# Patient Record
Sex: MALE | Race: WHITE | NOT HISPANIC OR LATINO | Employment: OTHER | URBAN - METROPOLITAN AREA
[De-identification: names, ages, dates, MRNs, and addresses within clinical notes are randomized per-mention and may not be internally consistent; named-entity substitution may affect disease eponyms.]

---

## 2019-09-06 ENCOUNTER — TRANSCRIBE ORDERS (OUTPATIENT)
Dept: ADMINISTRATIVE | Facility: HOSPITAL | Age: 69
End: 2019-09-06

## 2019-09-06 DIAGNOSIS — I10 ESSENTIAL HYPERTENSION, MALIGNANT: ICD-10-CM

## 2019-09-06 DIAGNOSIS — I48.0 PAROXYSMAL ATRIAL FIBRILLATION (HCC): Primary | ICD-10-CM

## 2019-09-17 ENCOUNTER — HOSPITAL ENCOUNTER (OUTPATIENT)
Dept: NUCLEAR MEDICINE | Facility: HOSPITAL | Age: 69
Discharge: HOME | End: 2019-09-17

## 2019-09-17 ENCOUNTER — HOSPITAL ENCOUNTER (OUTPATIENT)
Dept: CARDIOLOGY | Facility: HOSPITAL | Age: 69
Discharge: HOME | End: 2019-09-17
Admitting: INTERNAL MEDICINE

## 2019-09-17 VITALS
BODY MASS INDEX: 33.32 KG/M2 | RESPIRATION RATE: 20 BRPM | SYSTOLIC BLOOD PRESSURE: 168 MMHG | DIASTOLIC BLOOD PRESSURE: 72 MMHG | HEART RATE: 86 BPM | WEIGHT: 246 LBS | HEIGHT: 72 IN

## 2019-09-17 DIAGNOSIS — I10 ESSENTIAL HYPERTENSION, MALIGNANT: ICD-10-CM

## 2019-09-17 DIAGNOSIS — I48.0 PAROXYSMAL ATRIAL FIBRILLATION (HCC): ICD-10-CM

## 2019-09-17 PROCEDURE — A9500 TC99M SESTAMIBI: HCPCS | Performed by: INTERNAL MEDICINE

## 2019-09-17 PROCEDURE — 93016 CV STRESS TEST SUPVJ ONLY: CPT | Performed by: NURSE PRACTITIONER

## 2019-09-17 PROCEDURE — 93306 TTE W/DOPPLER COMPLETE: CPT

## 2019-09-17 PROCEDURE — 93306 TTE W/DOPPLER COMPLETE: CPT | Performed by: INTERNAL MEDICINE

## 2019-09-17 PROCEDURE — 25010000002 REGADENOSON 0.4 MG/5ML SOLUTION: Performed by: INTERNAL MEDICINE

## 2019-09-17 PROCEDURE — 0 TECHNETIUM SESTAMIBI: Performed by: INTERNAL MEDICINE

## 2019-09-17 PROCEDURE — 78452 HT MUSCLE IMAGE SPECT MULT: CPT

## 2019-09-17 PROCEDURE — 93017 CV STRESS TEST TRACING ONLY: CPT

## 2019-09-17 RX ADMIN — TECHNETIUM TC 99M SESTAMIBI 1 DOSE: 1 INJECTION INTRAVENOUS at 11:40

## 2019-09-17 RX ADMIN — REGADENOSON 0.4 MG: 0.08 INJECTION, SOLUTION INTRAVENOUS at 11:40

## 2019-09-17 RX ADMIN — TECHNETIUM TC 99M SESTAMIBI 1 DOSE: 1 INJECTION INTRAVENOUS at 10:30

## 2019-09-18 LAB
BH CV ECHO MEAS - ACS: 2 CM
BH CV ECHO MEAS - AO MAX PG (FULL): 4.1 MMHG
BH CV ECHO MEAS - AO MAX PG: 8.7 MMHG
BH CV ECHO MEAS - AO MEAN PG (FULL): 2.4 MMHG
BH CV ECHO MEAS - AO MEAN PG: 5 MMHG
BH CV ECHO MEAS - AO ROOT AREA (BSA CORRECTED): 1.5
BH CV ECHO MEAS - AO ROOT AREA: 9.9 CM^2
BH CV ECHO MEAS - AO ROOT DIAM: 3.6 CM
BH CV ECHO MEAS - AO V2 MAX: 147.4 CM/SEC
BH CV ECHO MEAS - AO V2 MEAN: 107.7 CM/SEC
BH CV ECHO MEAS - AO V2 VTI: 29.3 CM
BH CV ECHO MEAS - ASC AORTA: 3.5 CM
BH CV ECHO MEAS - AVA(I,A): 4 CM^2
BH CV ECHO MEAS - AVA(I,D): 4 CM^2
BH CV ECHO MEAS - AVA(V,A): 3.7 CM^2
BH CV ECHO MEAS - AVA(V,D): 3.7 CM^2
BH CV ECHO MEAS - BSA(HAYCOCK): 2.4 M^2
BH CV ECHO MEAS - BSA: 2.3 M^2
BH CV ECHO MEAS - BZI_BMI: 33.4 KILOGRAMS/M^2
BH CV ECHO MEAS - BZI_METRIC_HEIGHT: 182.9 CM
BH CV ECHO MEAS - BZI_METRIC_WEIGHT: 111.6 KG
BH CV ECHO MEAS - EDV(CUBED): 87.8 ML
BH CV ECHO MEAS - EDV(MOD-SP2): 195.4 ML
BH CV ECHO MEAS - EDV(MOD-SP4): 163.1 ML
BH CV ECHO MEAS - EDV(TEICH): 89.8 ML
BH CV ECHO MEAS - EF(CUBED): 63.3 %
BH CV ECHO MEAS - EF(MOD-SP2): 55.6 %
BH CV ECHO MEAS - EF(MOD-SP4): 56 %
BH CV ECHO MEAS - EF(TEICH): 55 %
BH CV ECHO MEAS - ESV(CUBED): 32.2 ML
BH CV ECHO MEAS - ESV(MOD-SP2): 86.8 ML
BH CV ECHO MEAS - ESV(MOD-SP4): 71.7 ML
BH CV ECHO MEAS - ESV(TEICH): 40.4 ML
BH CV ECHO MEAS - FS: 28.4 %
BH CV ECHO MEAS - IVS/LVPW: 1.1
BH CV ECHO MEAS - IVSD: 1.5 CM
BH CV ECHO MEAS - LV DIASTOLIC VOL/BSA (35-75): 70.1 ML/M^2
BH CV ECHO MEAS - LV MASS(C)D: 270.6 GRAMS
BH CV ECHO MEAS - LV MASS(C)DI: 116.3 GRAMS/M^2
BH CV ECHO MEAS - LV MAX PG: 4.6 MMHG
BH CV ECHO MEAS - LV MEAN PG: 2.6 MMHG
BH CV ECHO MEAS - LV SYSTOLIC VOL/BSA (12-30): 30.8 ML/M^2
BH CV ECHO MEAS - LV V1 MAX: 107.3 CM/SEC
BH CV ECHO MEAS - LV V1 MEAN: 73.6 CM/SEC
BH CV ECHO MEAS - LV V1 VTI: 23.1 CM
BH CV ECHO MEAS - LVIDD: 4.4 CM
BH CV ECHO MEAS - LVIDS: 3.2 CM
BH CV ECHO MEAS - LVOT AREA: 5.1 CM^2
BH CV ECHO MEAS - LVOT DIAM: 2.6 CM
BH CV ECHO MEAS - LVPWD: 1.5 CM
BH CV ECHO MEAS - MV A MAX VEL: 61.8 CM/SEC
BH CV ECHO MEAS - MV DEC SLOPE: 286.4 CM/SEC^2
BH CV ECHO MEAS - MV DEC TIME: 0.25 SEC
BH CV ECHO MEAS - MV E MAX VEL: 72.8 CM/SEC
BH CV ECHO MEAS - MV E/A: 1.2
BH CV ECHO MEAS - MV MAX PG: 1.8 MMHG
BH CV ECHO MEAS - MV MEAN PG: 1 MMHG
BH CV ECHO MEAS - MV V2 MAX: 66.6 CM/SEC
BH CV ECHO MEAS - MV V2 MEAN: 47.6 CM/SEC
BH CV ECHO MEAS - MV V2 VTI: 21.7 CM
BH CV ECHO MEAS - MVA(VTI): 5.4 CM^2
BH CV ECHO MEAS - PA ACC TIME: 0.16 SEC
BH CV ECHO MEAS - PA MAX PG (FULL): 3.9 MMHG
BH CV ECHO MEAS - PA MAX PG: 7.5 MMHG
BH CV ECHO MEAS - PA MEAN PG (FULL): 2 MMHG
BH CV ECHO MEAS - PA MEAN PG: 4.2 MMHG
BH CV ECHO MEAS - PA PR(ACCEL): 8.4 MMHG
BH CV ECHO MEAS - PA V2 MAX: 137 CM/SEC
BH CV ECHO MEAS - PA V2 MEAN: 98.4 CM/SEC
BH CV ECHO MEAS - PA V2 VTI: 26.3 CM
BH CV ECHO MEAS - PVA(I,A): 7.7 CM^2
BH CV ECHO MEAS - PVA(I,D): 7.7 CM^2
BH CV ECHO MEAS - PVA(V,A): 6.2 CM^2
BH CV ECHO MEAS - PVA(V,D): 6.2 CM^2
BH CV ECHO MEAS - QP/QS: 1.7
BH CV ECHO MEAS - RAP SYSTOLE: 5 MMHG
BH CV ECHO MEAS - RV MAX PG: 3.6 MMHG
BH CV ECHO MEAS - RV MEAN PG: 2.2 MMHG
BH CV ECHO MEAS - RV V1 MAX: 95.3 CM/SEC
BH CV ECHO MEAS - RV V1 MEAN: 70.1 CM/SEC
BH CV ECHO MEAS - RV V1 VTI: 23 CM
BH CV ECHO MEAS - RVDD: 3.8 CM
BH CV ECHO MEAS - RVOT AREA: 8.9 CM^2
BH CV ECHO MEAS - RVOT DIAM: 3.4 CM
BH CV ECHO MEAS - RVSP: 18.7 MMHG
BH CV ECHO MEAS - SI(AO): 124.8 ML/M^2
BH CV ECHO MEAS - SI(CUBED): 23.9 ML/M^2
BH CV ECHO MEAS - SI(LVOT): 50.7 ML/M^2
BH CV ECHO MEAS - SI(MOD-SP2): 46.7 ML/M^2
BH CV ECHO MEAS - SI(MOD-SP4): 39.3 ML/M^2
BH CV ECHO MEAS - SI(TEICH): 21.2 ML/M^2
BH CV ECHO MEAS - SV(AO): 290.3 ML
BH CV ECHO MEAS - SV(CUBED): 55.5 ML
BH CV ECHO MEAS - SV(LVOT): 118 ML
BH CV ECHO MEAS - SV(MOD-SP2): 108.6 ML
BH CV ECHO MEAS - SV(MOD-SP4): 91.4 ML
BH CV ECHO MEAS - SV(RVOT): 203.4 ML
BH CV ECHO MEAS - SV(TEICH): 49.4 ML
BH CV ECHO MEAS - TR MAX VEL: 182.7 CM/SEC

## 2019-09-25 LAB
BH CV NUCLEAR PRIOR STUDY: 3
BH CV STRESS BP STAGE 1: NORMAL
BH CV STRESS BP STAGE 2: NORMAL
BH CV STRESS BP STAGE 3: NORMAL
BH CV STRESS BP STAGE 4: NORMAL
BH CV STRESS BP STAGE 5: NORMAL
BH CV STRESS COMMENTS STAGE 1: NORMAL
BH CV STRESS COMMENTS STAGE 2: NORMAL
BH CV STRESS DOSE REGADENOSON STAGE 1: 0.4
BH CV STRESS DURATION MIN STAGE 1: 0
BH CV STRESS DURATION MIN STAGE 2: 4
BH CV STRESS DURATION SEC STAGE 1: 10
BH CV STRESS DURATION SEC STAGE 2: 0
BH CV STRESS HR STAGE 1: 82
BH CV STRESS HR STAGE 2: 86
BH CV STRESS HR STAGE 3: 81
BH CV STRESS HR STAGE 4: 76
BH CV STRESS HR STAGE 5: 74
BH CV STRESS PROTOCOL 1: NORMAL
BH CV STRESS RECOVERY BP: NORMAL MMHG
BH CV STRESS RECOVERY HR: 85 BPM
BH CV STRESS STAGE 1: 1
BH CV STRESS STAGE 2: 2
BH CV STRESS STAGE 3: 3
BH CV STRESS STAGE 4: 4
BH CV STRESS STAGE 5: 5
LV EF NUC BP: 49 %
MAXIMAL PREDICTED HEART RATE: 151 BPM
PERCENT MAX PREDICTED HR: 56.95 %
STRESS BASELINE BP: NORMAL MMHG
STRESS BASELINE HR: 69 BPM
STRESS PERCENT HR: 67 %
STRESS POST PEAK BP: NORMAL MMHG
STRESS POST PEAK HR: 86 BPM
STRESS TARGET HR: 128 BPM

## 2019-09-25 PROCEDURE — 78452 HT MUSCLE IMAGE SPECT MULT: CPT | Performed by: INTERNAL MEDICINE

## 2019-09-25 PROCEDURE — 93018 CV STRESS TEST I&R ONLY: CPT | Performed by: INTERNAL MEDICINE

## 2021-04-28 RX ORDER — CHLORTHALIDONE 25 MG/1
750 TABLET ORAL 2 TIMES DAILY
COMMUNITY

## 2021-04-28 RX ORDER — MAGNESIUM OXIDE 400 MG/1
400 TABLET ORAL DAILY
COMMUNITY

## 2021-04-28 RX ORDER — ATORVASTATIN CALCIUM 10 MG/1
10 TABLET, FILM COATED ORAL DAILY
COMMUNITY

## 2021-04-28 RX ORDER — TAMSULOSIN HYDROCHLORIDE 0.4 MG/1
1 CAPSULE ORAL DAILY
Status: ON HOLD | COMMUNITY
End: 2021-10-19

## 2021-04-28 RX ORDER — FINASTERIDE 5 MG/1
5 TABLET, FILM COATED ORAL DAILY
Status: ON HOLD | COMMUNITY
End: 2021-10-19

## 2021-04-28 RX ORDER — METOPROLOL TARTRATE 50 MG/1
50 TABLET, FILM COATED ORAL 2 TIMES DAILY
COMMUNITY
End: 2021-10-19 | Stop reason: HOSPADM

## 2021-04-28 RX ORDER — LOSARTAN POTASSIUM 50 MG/1
50 TABLET ORAL DAILY
COMMUNITY

## 2021-05-03 ENCOUNTER — LAB (OUTPATIENT)
Dept: LAB | Facility: HOSPITAL | Age: 71
End: 2021-05-03

## 2021-05-03 LAB — SARS-COV-2 ORF1AB RESP QL NAA+PROBE: NOT DETECTED

## 2021-05-03 PROCEDURE — C9803 HOPD COVID-19 SPEC COLLECT: HCPCS

## 2021-05-03 PROCEDURE — U0004 COV-19 TEST NON-CDC HGH THRU: HCPCS

## 2021-05-04 PROBLEM — K92.1 BLOOD IN STOOL: Status: ACTIVE | Noted: 2021-05-04

## 2021-05-05 ENCOUNTER — HOSPITAL ENCOUNTER (OUTPATIENT)
Facility: HOSPITAL | Age: 71
Setting detail: HOSPITAL OUTPATIENT SURGERY
Discharge: HOME | End: 2021-05-05
Attending: INTERNAL MEDICINE | Admitting: INTERNAL MEDICINE

## 2021-05-05 ENCOUNTER — ON CAMPUS - OUTPATIENT (OUTPATIENT)
Dept: URBAN - METROPOLITAN AREA HOSPITAL 85 | Facility: HOSPITAL | Age: 71
End: 2021-05-05
Payer: MEDICARE

## 2021-05-05 ENCOUNTER — ANESTHESIA (OUTPATIENT)
Dept: GASTROENTEROLOGY | Facility: HOSPITAL | Age: 71
End: 2021-05-05

## 2021-05-05 ENCOUNTER — ANESTHESIA EVENT (OUTPATIENT)
Dept: GASTROENTEROLOGY | Facility: HOSPITAL | Age: 71
End: 2021-05-05

## 2021-05-05 VITALS
WEIGHT: 231.6 LBS | TEMPERATURE: 98 F | HEART RATE: 64 BPM | RESPIRATION RATE: 11 BRPM | DIASTOLIC BLOOD PRESSURE: 61 MMHG | OXYGEN SATURATION: 97 % | HEIGHT: 72 IN | SYSTOLIC BLOOD PRESSURE: 107 MMHG | BODY MASS INDEX: 31.37 KG/M2

## 2021-05-05 DIAGNOSIS — R19.5 OTHER FECAL ABNORMALITIES: ICD-10-CM

## 2021-05-05 DIAGNOSIS — D12.3 BENIGN NEOPLASM OF TRANSVERSE COLON: ICD-10-CM

## 2021-05-05 DIAGNOSIS — D12.5 BENIGN NEOPLASM OF SIGMOID COLON: ICD-10-CM

## 2021-05-05 DIAGNOSIS — D12.0 BENIGN NEOPLASM OF CECUM: ICD-10-CM

## 2021-05-05 DIAGNOSIS — K92.1 BLOOD IN STOOL: ICD-10-CM

## 2021-05-05 DIAGNOSIS — K64.0 FIRST DEGREE HEMORRHOIDS: ICD-10-CM

## 2021-05-05 PROCEDURE — 25010000002 PROPOFOL 10 MG/ML EMULSION: Performed by: ANESTHESIOLOGY

## 2021-05-05 PROCEDURE — 45385 COLONOSCOPY W/LESION REMOVAL: CPT | Performed by: INTERNAL MEDICINE

## 2021-05-05 PROCEDURE — C1889 IMPLANT/INSERT DEVICE, NOC: HCPCS | Performed by: INTERNAL MEDICINE

## 2021-05-05 PROCEDURE — 88305 TISSUE EXAM BY PATHOLOGIST: CPT | Performed by: INTERNAL MEDICINE

## 2021-05-05 DEVICE — DEV CLIP HEMO DURACLIP REPOSTNG COLONOSCOPE 16MM 235CM: Type: IMPLANTABLE DEVICE | Site: SIGMOID COLON | Status: FUNCTIONAL

## 2021-05-05 RX ORDER — ONDANSETRON 2 MG/ML
4 INJECTION INTRAMUSCULAR; INTRAVENOUS ONCE AS NEEDED
Status: DISCONTINUED | OUTPATIENT
Start: 2021-05-05 | End: 2021-05-05 | Stop reason: HOSPADM

## 2021-05-05 RX ORDER — LIDOCAINE HYDROCHLORIDE 10 MG/ML
INJECTION, SOLUTION EPIDURAL; INFILTRATION; INTRACAUDAL; PERINEURAL AS NEEDED
Status: DISCONTINUED | OUTPATIENT
Start: 2021-05-05 | End: 2021-05-05 | Stop reason: SURG

## 2021-05-05 RX ORDER — PROPOFOL 10 MG/ML
VIAL (ML) INTRAVENOUS AS NEEDED
Status: DISCONTINUED | OUTPATIENT
Start: 2021-05-05 | End: 2021-05-05 | Stop reason: SURG

## 2021-05-05 RX ORDER — SODIUM CHLORIDE 9 MG/ML
INJECTION, SOLUTION INTRAVENOUS CONTINUOUS PRN
Status: DISCONTINUED | OUTPATIENT
Start: 2021-05-05 | End: 2021-05-05 | Stop reason: SURG

## 2021-05-05 RX ADMIN — LIDOCAINE HYDROCHLORIDE 50 MG: 10 INJECTION, SOLUTION EPIDURAL; INFILTRATION; INTRACAUDAL; PERINEURAL at 11:39

## 2021-05-05 RX ADMIN — SODIUM CHLORIDE: 0.9 INJECTION, SOLUTION INTRAVENOUS at 11:34

## 2021-05-05 RX ADMIN — PROPOFOL 270 MG: 10 INJECTION, EMULSION INTRAVENOUS at 11:39

## 2021-05-05 NOTE — ANESTHESIA PREPROCEDURE EVALUATION
Anesthesia Evaluation     Patient summary reviewed and Nursing notes reviewed   no history of anesthetic complications:  NPO Solid Status: > 8 hours  NPO Liquid Status: > 8 hours           Airway   Dental      Pulmonary    (+) a smoker Former, sleep apnea,   Cardiovascular     PT is on anticoagulation therapy  Patient on routine beta blocker    (+) pacemaker pacemaker, hypertension, dysrhythmias Atrial Flutter, hyperlipidemia,       Neuro/Psych  GI/Hepatic/Renal/Endo    (+) obesity,       Musculoskeletal     Abdominal    Substance History      OB/GYN          Other        ROS/Med Hx Other:     Echocardiogram Findings    Left Ventricle Left ventricular systolic function is normal. Estimated EF appears to be in the range of 56 - 60%. Normal left ventricular cavity size noted. All left ventricular wall segments contract normally. Left ventricular wall thickness is consistent with mild concentric hypertrophy. Septal wall motion is normal. Left ventricular diastolic function is normal.  Right Ventricle Normal right ventricular wall thickness and systolic function noted. Right ventricular cavity is mild-to-moderately dilated.  Left Atrium Normal left atrial size and volume noted.  Right Atrium Normal right atrial size noted.  Aortic Valve The aortic valve is structurally normal. The valve appears trileaflet. No aortic valve regurgitation is present. No aortic valve stenosis is present.  Mitral Valve The mitral valve is normal in structure. No mitral valve regurgitation is present. No significant mitral valve stenosis is present.  Tricuspid Valve The tricuspid valve is normal.  No tricuspid valve regurgitation is present. Estimated right ventricular systolic pressure from tricuspid regurgitation is normal (<35 mmHg).  Pulmonic Valve The pulmonic valve is not well visualized.  Greater Vessels No dilation of the aortic root is present.  Pericardium The pericardium is normal. There is no evidence of pericardial  effusion.    Stress  · Findings consistent with an equivocal ECG stress test.  · Left ventricular ejection fraction is mildly reduced (Calculated EF = 49%).     Stress portion of this exam was supervised by: Alexia Huddlestno NP     Abnormal Lexiscan Myoview study demonstrating inferolateral inferoseptal radionuclide uptake abnormalities that are noticeably worse on rest images and post right adenosine suggesting most likely attenuation artifact.  No definite reversible ischemic segments are identified.     Mildly reduced LV systolic function with apical septal hypokinesis; quantitated LVEF 49% by gated SPECT analysis.    PSH  PACEMAKER IMPLANTATION APPENDECTOMY  EYE SURGERY CARDIAC CATHETERIZATION                Anesthesia Plan    ASA 3     MAC   (Patient identified; pre-operative vital signs, all relevant labs/studies, complete medical/surgical/anesthetic history, full medication list, full allergy list, and NPO status obtained/reviewed; physical assessment performed; anesthetic options, side effects, potential complications, risks, and benefits discussed; questions answered; written anesthesia consent obtained; patient cleared for procedure; anesthesia machine and equipment checked and functioning)    Anesthetic plan, all risks, benefits, and alternatives have been provided, discussed and informed consent has been obtained with: patient.

## 2021-05-05 NOTE — ANESTHESIA POSTPROCEDURE EVALUATION
Patient: Leroy Mccrary    Procedure Summary     Date: 05/05/21 Room / Location: [facility] ENDOSCOPY 1 / [facility] ENDOSCOPY    Anesthesia Start: 1134 Anesthesia Stop: 1202    Procedure: COLONOSCOPY (N/A ) Diagnosis:       Blood in stool      (Blood in stool [K92.1])    Surgeons: Mayco Bui MD Provider: Jhon Kennedy MD    Anesthesia Type: MAC ASA Status: 3          Anesthesia Type: MAC    Vitals  No vitals data found for the desired time range.          Post Anesthesia Care and Evaluation    Patient location during evaluation: PACU  Patient participation: complete - patient participated  Level of consciousness: awake  Pain scale: See nurse's notes for pain score.  Pain management: adequate  Airway patency: patent  Anesthetic complications: No anesthetic complications  PONV Status: none  Cardiovascular status: acceptable  Respiratory status: acceptable  Hydration status: acceptable    Comments: Patient seen and examined postoperatively; vital signs stable; SpO2 greater than or equal to 90%; cardiopulmonary status stable; nausea/vomiting adequately controlled; pain adequately controlled; no apparent anesthesia complications; patient discharged from anesthesia care when discharge criteria were met

## 2021-05-06 LAB
LAB AP CASE REPORT: NORMAL
PATH REPORT.FINAL DX SPEC: NORMAL
PATH REPORT.GROSS SPEC: NORMAL

## 2021-05-17 ENCOUNTER — APPOINTMENT (OUTPATIENT)
Dept: GENERAL RADIOLOGY | Facility: HOSPITAL | Age: 71
End: 2021-05-17

## 2021-05-17 ENCOUNTER — PATIENT OUTREACH (OUTPATIENT)
Dept: CASE MANAGEMENT | Facility: OTHER | Age: 71
End: 2021-05-17

## 2021-05-17 ENCOUNTER — EPISODE CHANGES (OUTPATIENT)
Dept: CASE MANAGEMENT | Facility: OTHER | Age: 71
End: 2021-05-17

## 2021-05-17 ENCOUNTER — APPOINTMENT (OUTPATIENT)
Dept: CT IMAGING | Facility: HOSPITAL | Age: 71
End: 2021-05-17

## 2021-05-17 ENCOUNTER — HOSPITAL ENCOUNTER (EMERGENCY)
Facility: HOSPITAL | Age: 71
Discharge: HOME | End: 2021-05-17
Attending: EMERGENCY MEDICINE | Admitting: EMERGENCY MEDICINE

## 2021-05-17 VITALS
DIASTOLIC BLOOD PRESSURE: 76 MMHG | BODY MASS INDEX: 31.65 KG/M2 | TEMPERATURE: 98.1 F | WEIGHT: 233.69 LBS | RESPIRATION RATE: 17 BRPM | HEIGHT: 72 IN | OXYGEN SATURATION: 98 % | SYSTOLIC BLOOD PRESSURE: 131 MMHG | HEART RATE: 76 BPM

## 2021-05-17 DIAGNOSIS — R06.02 SHORTNESS OF BREATH: ICD-10-CM

## 2021-05-17 DIAGNOSIS — R06.6 HICCUPS: Primary | ICD-10-CM

## 2021-05-17 LAB
ALBUMIN SERPL-MCNC: 3.6 G/DL (ref 3.5–5.2)
ALBUMIN/GLOB SERPL: 1.2 G/DL
ALP SERPL-CCNC: 84 U/L (ref 39–117)
ALT SERPL W P-5'-P-CCNC: 30 U/L (ref 1–41)
ANION GAP SERPL CALCULATED.3IONS-SCNC: 13 MMOL/L (ref 5–15)
AST SERPL-CCNC: 27 U/L (ref 1–40)
BASOPHILS # BLD AUTO: 0.1 10*3/MM3 (ref 0–0.2)
BASOPHILS NFR BLD AUTO: 0.4 % (ref 0–1.5)
BILIRUB SERPL-MCNC: 0.9 MG/DL (ref 0–1.2)
BUN SERPL-MCNC: 20 MG/DL (ref 8–23)
BUN/CREAT SERPL: 18.9 (ref 7–25)
CALCIUM SPEC-SCNC: 8.9 MG/DL (ref 8.6–10.5)
CHLORIDE SERPL-SCNC: 99 MMOL/L (ref 98–107)
CO2 SERPL-SCNC: 22 MMOL/L (ref 22–29)
CREAT SERPL-MCNC: 1.06 MG/DL (ref 0.76–1.27)
DEPRECATED RDW RBC AUTO: 41.6 FL (ref 37–54)
EOSINOPHIL # BLD AUTO: 0.1 10*3/MM3 (ref 0–0.4)
EOSINOPHIL NFR BLD AUTO: 0.6 % (ref 0.3–6.2)
ERYTHROCYTE [DISTWIDTH] IN BLOOD BY AUTOMATED COUNT: 12.5 % (ref 12.3–15.4)
GFR SERPL CREATININE-BSD FRML MDRD: 69 ML/MIN/1.73
GLOBULIN UR ELPH-MCNC: 3.1 GM/DL
GLUCOSE SERPL-MCNC: 132 MG/DL (ref 65–99)
HCT VFR BLD AUTO: 37.6 % (ref 37.5–51)
HGB BLD-MCNC: 13 G/DL (ref 13–17.7)
LIPASE SERPL-CCNC: 20 U/L (ref 13–60)
LYMPHOCYTES # BLD AUTO: 0.9 10*3/MM3 (ref 0.7–3.1)
LYMPHOCYTES NFR BLD AUTO: 7.7 % (ref 19.6–45.3)
MCH RBC QN AUTO: 32.4 PG (ref 26.6–33)
MCHC RBC AUTO-ENTMCNC: 34.6 G/DL (ref 31.5–35.7)
MCV RBC AUTO: 93.5 FL (ref 79–97)
MONOCYTES # BLD AUTO: 1.1 10*3/MM3 (ref 0.1–0.9)
MONOCYTES NFR BLD AUTO: 9.2 % (ref 5–12)
NEUTROPHILS NFR BLD AUTO: 82.1 % (ref 42.7–76)
NEUTROPHILS NFR BLD AUTO: 9.6 10*3/MM3 (ref 1.7–7)
NRBC BLD AUTO-RTO: 0 /100 WBC (ref 0–0.2)
PLATELET # BLD AUTO: 175 10*3/MM3 (ref 140–450)
PMV BLD AUTO: 8.2 FL (ref 6–12)
POTASSIUM SERPL-SCNC: 4 MMOL/L (ref 3.5–5.2)
PROT SERPL-MCNC: 6.7 G/DL (ref 6–8.5)
RBC # BLD AUTO: 4.02 10*6/MM3 (ref 4.14–5.8)
SODIUM SERPL-SCNC: 134 MMOL/L (ref 136–145)
TROPONIN T SERPL-MCNC: 0.01 NG/ML (ref 0–0.03)
WBC # BLD AUTO: 11.7 10*3/MM3 (ref 3.4–10.8)

## 2021-05-17 PROCEDURE — 99283 EMERGENCY DEPT VISIT LOW MDM: CPT

## 2021-05-17 PROCEDURE — 96372 THER/PROPH/DIAG INJ SC/IM: CPT

## 2021-05-17 PROCEDURE — 25010000002 CHLORPROMAZINE PER 50 MG: Performed by: EMERGENCY MEDICINE

## 2021-05-17 PROCEDURE — 71045 X-RAY EXAM CHEST 1 VIEW: CPT

## 2021-05-17 PROCEDURE — 84484 ASSAY OF TROPONIN QUANT: CPT | Performed by: EMERGENCY MEDICINE

## 2021-05-17 PROCEDURE — 93005 ELECTROCARDIOGRAM TRACING: CPT | Performed by: EMERGENCY MEDICINE

## 2021-05-17 PROCEDURE — 85025 COMPLETE CBC W/AUTO DIFF WBC: CPT | Performed by: EMERGENCY MEDICINE

## 2021-05-17 PROCEDURE — 80053 COMPREHEN METABOLIC PANEL: CPT | Performed by: EMERGENCY MEDICINE

## 2021-05-17 PROCEDURE — 74176 CT ABD & PELVIS W/O CONTRAST: CPT

## 2021-05-17 PROCEDURE — 83690 ASSAY OF LIPASE: CPT | Performed by: EMERGENCY MEDICINE

## 2021-05-17 RX ORDER — CHLORPROMAZINE HYDROCHLORIDE 25 MG/ML
25 INJECTION INTRAMUSCULAR ONCE
Status: COMPLETED | OUTPATIENT
Start: 2021-05-17 | End: 2021-05-17

## 2021-05-17 RX ORDER — CHLORPROMAZINE HYDROCHLORIDE 25 MG/1
25 TABLET, FILM COATED ORAL 3 TIMES DAILY PRN
Qty: 9 TABLET | Refills: 0 | Status: ON HOLD | OUTPATIENT
Start: 2021-05-17 | End: 2021-10-19

## 2021-05-17 RX ADMIN — CHLORPROMAZINE HYDROCHLORIDE 25 MG: 25 INJECTION INTRAMUSCULAR at 02:31

## 2021-05-17 NOTE — DISCHARGE INSTRUCTIONS
Follow-up with your cardiologist call this morning and follow-up with the shortness of breath and hiccups.  Return for chest pain neck arm jaw pain increasing shortness of breath dizziness passing out fever coughing up blood vomiting blood pain anywhere persistent symptoms or any other new or worsening problems or concerns  Thorazine sent to your pharmacy

## 2021-05-17 NOTE — ED PROVIDER NOTES
Subjective   Chief complaint hiccups short of breath vomited x1    History of present illness 70-year-old male who complains of a 3-day history of persistent hiccups that come and go.  The patient states he had no pain with this but tonight he felt short of breath and vomited one time.  No bleeding.  The patient had a recent colonoscopy the first part of May had a polyp taken off he does take Xarelto but no other medications have been added.  He denies chest pain neck arm jaw pain or current shortness of breath no leg pain or swelling no recent long car ride plane or immobilization foreign travels.  Denies any injury no headaches.  He does feel a bit anxious he states he is getting ready to have a UroLift next week.  Nothing seems to trigger it nothing makes it better ongoing pretty persistent for 3 days no associated pain but 1 episode of shortness of breath and vomiting tonight.          Review of Systems   Constitutional: Negative for chills and fever.   HENT: Negative for congestion and sinus pressure.    Respiratory: Positive for shortness of breath. Negative for chest tightness.    Cardiovascular: Negative for chest pain and leg swelling.   Gastrointestinal: Positive for vomiting. Negative for abdominal pain.   Endocrine: Negative for cold intolerance and heat intolerance.   Genitourinary: Positive for difficulty urinating. Negative for dysuria.   Musculoskeletal: Negative for arthralgias and back pain.   Skin: Negative for color change and pallor.   Neurological: Negative for dizziness and light-headedness.   Psychiatric/Behavioral: Negative for agitation and behavioral problems.       Past Medical History:   Diagnosis Date   • Atrial flutter (CMS/HCC)    • Hypertension    • Sleep apnea        Allergies   Allergen Reactions   • Bextra [Valdecoxib] Unknown - Low Severity       Past Surgical History:   Procedure Laterality Date   • APPENDECTOMY     • CARDIAC CATHETERIZATION     • COLONOSCOPY N/A 5/5/2021     Procedure: COLONOSCOPY with 1 hot and 2 cold polyps with clip placement in sigmoid;  Surgeon: Mayco Bui MD;  Location: [facility] ENDOSCOPY;  Service: Gastroenterology;  Laterality: N/A;  colon polyps, internal hemorrhoids   • EYE SURGERY     • PACEMAKER IMPLANTATION      x2       No family history on file.    Social History     Socioeconomic History   • Marital status:      Spouse name: Not on file   • Number of children: Not on file   • Years of education: Not on file   • Highest education level: Not on file   Tobacco Use   • Smoking status: Former Smoker     Quit date:      Years since quittin.3   • Smokeless tobacco: Never Used   Substance and Sexual Activity   • Alcohol use: Not Currently   • Drug use: Never     Prior to Admission medications    Medication Sig Start Date End Date Taking? Authorizing Provider   atorvastatin (LIPITOR) 10 MG tablet Take 10 mg by mouth Daily.    Afia Choe MD   finasteride (PROSCAR) 5 MG tablet Take 5 mg by mouth Daily.    Afia Choe MD   losartan (COZAAR) 50 MG tablet Take 50 mg by mouth Daily.    Afia Choe MD   magnesium oxide (MAG-OX) 400 MG tablet Take 400 mg by mouth Daily.    Afia Choe MD   metoprolol tartrate (LOPRESSOR) 50 MG tablet Take 50 mg by mouth 2 (Two) Times a Day.    Afia Choe MD   niacin ER (Slo-Niacin) 750 MG tablet controlled-release tablet Take 750 mg by mouth 2 (two) times a day.    Afia Choe MD   rivaroxaban (XARELTO) 20 MG tablet Take 20 mg by mouth Daily. HOLD 48 per gsi    Afia Choe MD   tamsulosin (FLOMAX) 0.4 MG capsule 24 hr capsule Take 1 capsule by mouth Daily.    Afia Choe MD           Objective   Physical Exam  70-year-old male awake alert no acute distress  HEENT extraocular muscles are intact pupils equal round reactive mouth clear no exudate abscess stridor drooling no erythema no swelling for the mouth of the tongue  Neck  supple no adenopathy no meningeal signs no JVD no bruits  Lungs clear no retraction no use of accessory  Heart regular without murmur  Abdomen soft no tenderness good bowel sounds no peritoneal findings or pulsatile mass or bruits  Extremities pulses are equal throughout upper and lower extremities no edema cords or Homans' sign or evidence DVT.  Neurologic awake alert orientated x4 no facial asymmetry normal speech without focal weakness  Procedures           ED Course      Results for orders placed or performed during the hospital encounter of 05/17/21   Comprehensive Metabolic Panel    Specimen: Blood   Result Value Ref Range    Glucose 132 (H) 65 - 99 mg/dL    BUN 20 8 - 23 mg/dL    Creatinine 1.06 0.76 - 1.27 mg/dL    Sodium 134 (L) 136 - 145 mmol/L    Potassium 4.0 3.5 - 5.2 mmol/L    Chloride 99 98 - 107 mmol/L    CO2 22.0 22.0 - 29.0 mmol/L    Calcium 8.9 8.6 - 10.5 mg/dL    Total Protein 6.7 6.0 - 8.5 g/dL    Albumin 3.60 3.50 - 5.20 g/dL    ALT (SGPT) 30 1 - 41 U/L    AST (SGOT) 27 1 - 40 U/L    Alkaline Phosphatase 84 39 - 117 U/L    Total Bilirubin 0.9 0.0 - 1.2 mg/dL    eGFR Non African Amer 69 >60 mL/min/1.73    Globulin 3.1 gm/dL    A/G Ratio 1.2 g/dL    BUN/Creatinine Ratio 18.9 7.0 - 25.0    Anion Gap 13.0 5.0 - 15.0 mmol/L   Lipase    Specimen: Blood   Result Value Ref Range    Lipase 20 13 - 60 U/L   CBC Auto Differential    Specimen: Blood   Result Value Ref Range    WBC 11.70 (H) 3.40 - 10.80 10*3/mm3    RBC 4.02 (L) 4.14 - 5.80 10*6/mm3    Hemoglobin 13.0 13.0 - 17.7 g/dL    Hematocrit 37.6 37.5 - 51.0 %    MCV 93.5 79.0 - 97.0 fL    MCH 32.4 26.6 - 33.0 pg    MCHC 34.6 31.5 - 35.7 g/dL    RDW 12.5 12.3 - 15.4 %    RDW-SD 41.6 37.0 - 54.0 fl    MPV 8.2 6.0 - 12.0 fL    Platelets 175 140 - 450 10*3/mm3    Neutrophil % 82.1 (H) 42.7 - 76.0 %    Lymphocyte % 7.7 (L) 19.6 - 45.3 %    Monocyte % 9.2 5.0 - 12.0 %    Eosinophil % 0.6 0.3 - 6.2 %    Basophil % 0.4 0.0 - 1.5 %    Neutrophils, Absolute  9.60 (H) 1.70 - 7.00 10*3/mm3    Lymphocytes, Absolute 0.90 0.70 - 3.10 10*3/mm3    Monocytes, Absolute 1.10 (H) 0.10 - 0.90 10*3/mm3    Eosinophils, Absolute 0.10 0.00 - 0.40 10*3/mm3    Basophils, Absolute 0.10 0.00 - 0.20 10*3/mm3    nRBC 0.0 0.0 - 0.2 /100 WBC   Troponin    Specimen: Blood   Result Value Ref Range    Troponin T 0.014 0.000 - 0.030 ng/mL   ECG 12 Lead   Result Value Ref Range    QT Interval 412 ms     No radiology results for the last day  Medications   chlorproMAZINE (THORAZINE) injection 25 mg (25 mg Intramuscular Given 5/17/21 0231)          EKG my interpretation AV dual paced complexes 84 QTC of 461 PVCs no other ST elevation nothing old to compare with abnormal                                  MDM  Number of Diagnoses or Management Options  Hiccups: new and requires workup  Shortness of breath: new and requires workup  Diagnosis management comments: Medical decision making.  Patient had the above exam evaluation.  Is given Thorazine 25 mg IM.  He had the above exam and evaluation.  The patient was given an EKG which showed a AV dual paced rhythm.  The patient has a pacemaker he has that checked every 3 months and has been fine recently.  Chest x-ray without active disease CBC white count 11,000 otherwise negative chemistries unremarkable liver lipase unremarkable troponin within normal limits.  CT abdomen pelvis shows some chronic bladder outlet obstruction enlarged prostate but otherwise no acute findings.  Patient will repeat exam is resting company has had no further hiccups he feels much better sats are 98% respiratory rate of 18 and heart rate in the 70s.  He has no pain.  His abdomen was soft without tenderness no peritoneal findings.  Talked about the multiple possibilities we talked about heart disease and heart attacks and irritation to the diaphragm this looks to be okay currently but he did have some shortness of breath earlier tonight that is resolved with negative troponins and  clear chest x-ray.  I do not see an intra-abdominal process.  No abscesses no signs of infection patient has had some constipation.  But no obvious cause of his hiccups by these findings tonight.  No evidence really suggest a DVT or pulmonary embolism there is no swelling or pain in the legs.  The patient is already on Xarelto he is got sats at 98% on room air respiratory of 18 heart rate is normal.  And he looks well at this point.  No evidence of acute aortic dissection by clinical exam good and equal pulses throughout upper and lower extremities mediastinum looks well on chest x-ray.  No paced rhythm on his EKG we did talk about even observation in the hospital for further evaluation we talked about inpatient outpatient shared medical decision making on this issue and discussing what we found he elected that he would rather go home and follow-up as an outpatient.  I think this is reasonable and we talked about what to return for.  His hiccups seem to resolve we will send Romi to his pharmacy for the next couple days will call his cardiologist today he will call his primary care doctor and if he develops chest pain neck arm jaw pain increasing shortness of breath reoccurrence of the symptoms or any other worsening problems he will return immediately to the ER.  Stable otherwise unremarkable ER course.  Laboratory reviewed by me EKG read by me chest x-ray reviewed by me CT report reviewed by me.       Amount and/or Complexity of Data Reviewed  Clinical lab tests: reviewed  Tests in the radiology section of CPT®: reviewed  Tests in the medicine section of CPT®: reviewed  Independent visualization of images, tracings, or specimens: yes    Risk of Complications, Morbidity, and/or Mortality  Presenting problems: moderate  Diagnostic procedures: moderate  Management options: moderate    Patient Progress  Patient progress: stable      Final diagnoses:   Hiccups   Shortness of breath       ED Disposition  ED  Disposition     ED Disposition Condition Comment    Discharge Stable           JenniferSydnie, APRN  [address]  [place] [address] [phone]    In 1 day           Medication List      New Prescriptions    chlorproMAZINE 25 MG tablet  Commonly known as: THORAZINE  Take 1 tablet by mouth 3 (Three) Times a Day As Needed for Nausea (Hiccups).           Where to Get Your Medications      These medications were sent to [id number] - [place], [place] - [address] AT SEC OF [place] [address] & [address] - [phone]  - [phone]   [address] [facility] [place] [address]    Phone: [phone]   · chlorproMAZINE 25 MG tablet          Karlos Barnes MD  05/17/21 8487

## 2021-05-17 NOTE — OUTREACH NOTE
Care Plan Note      Responses   AWV Materials  Send Materials   Care Gaps Addressed  Colon Cancer Screening, Flu Shot   Flu Shot Status  Up to Date   Other Patient Education/Resources   24/7 [facility] Nurse Call Line, Advanced Care Planning, MyChart   24/7 Nurse Call Line Education Method  Verbal   ACP Education Method  Verbal [Completed]   MyChart Education Method  Verbal [Active]   Does patient have depression diagnosis?  No   Advanced Directives:  Patient Has   Ed Visits past 12 months:  1   Hospitalizations past 12 months  1   Medication Adherence  Medications understood        The main concerns and/or symptoms the patient would like to address are:Talked with patient. Discussed 5/17/21 ED visit regarding hiccups. Patient states to be compliant with ED recommendations; will obtain chlorpromazine as directed  and states symptoms have improved. Patient voiced intent to contact PCP and cardiologists as directed for recommendations. He states to have had difficulty taking deep breath; improved and no difficulty with chest pain; SOB; appetite or sleeping.  Patient lives with spouse; independent with ADL's; meal preparation; transportation; ambulates without assistive device . Patient compliant with medications; medical appointments and has received Moderna vaccine.      Education/instruction provided by Care Coordinator: Reviewed with patient ED AVS recommendations; education provided; COVID 19 precautions; 24/7 Nurse Line Telephone number; ACM contact information; Advance Directives; My Chart; gaps in care; MWV and Case Management services.  Patient verbalized understanding and states to appreciate phone call.  SDOH reviewed and patient states no difficulty with food; transportation or financial resources. No further questions or concerns voiced at this time.     Follow Up Outreach Due: Follow up as needed.     Kerline Moser RN  Ambulatory     5/17/2021, 12:39 EDT

## 2021-05-20 LAB — QT INTERVAL: 412 MS

## 2021-10-18 ENCOUNTER — HOSPITAL ENCOUNTER (OUTPATIENT)
Facility: HOSPITAL | Age: 71
Setting detail: OBSERVATION
Discharge: HOME | End: 2021-10-19
Attending: EMERGENCY MEDICINE | Admitting: EMERGENCY MEDICINE

## 2021-10-18 ENCOUNTER — APPOINTMENT (OUTPATIENT)
Dept: GENERAL RADIOLOGY | Facility: HOSPITAL | Age: 71
End: 2021-10-18

## 2021-10-18 DIAGNOSIS — R42 LIGHTHEADED: Primary | ICD-10-CM

## 2021-10-18 DIAGNOSIS — I49.8 BIGEMINY: ICD-10-CM

## 2021-10-18 LAB
BASOPHILS # BLD AUTO: 0.1 10*3/MM3 (ref 0–0.2)
BASOPHILS NFR BLD AUTO: 0.6 % (ref 0–1.5)
DEPRECATED RDW RBC AUTO: 44.2 FL (ref 37–54)
EOSINOPHIL # BLD AUTO: 0.3 10*3/MM3 (ref 0–0.4)
EOSINOPHIL NFR BLD AUTO: 3.5 % (ref 0.3–6.2)
ERYTHROCYTE [DISTWIDTH] IN BLOOD BY AUTOMATED COUNT: 13.2 % (ref 12.3–15.4)
HCT VFR BLD AUTO: 41.9 % (ref 37.5–51)
HGB BLD-MCNC: 14.4 G/DL (ref 13–17.7)
LYMPHOCYTES # BLD AUTO: 2 10*3/MM3 (ref 0.7–3.1)
LYMPHOCYTES NFR BLD AUTO: 23.8 % (ref 19.6–45.3)
MCH RBC QN AUTO: 32.3 PG (ref 26.6–33)
MCHC RBC AUTO-ENTMCNC: 34.4 G/DL (ref 31.5–35.7)
MCV RBC AUTO: 93.9 FL (ref 79–97)
MONOCYTES # BLD AUTO: 0.7 10*3/MM3 (ref 0.1–0.9)
MONOCYTES NFR BLD AUTO: 8.1 % (ref 5–12)
NEUTROPHILS NFR BLD AUTO: 5.5 10*3/MM3 (ref 1.7–7)
NEUTROPHILS NFR BLD AUTO: 64 % (ref 42.7–76)
NRBC BLD AUTO-RTO: 0.1 /100 WBC (ref 0–0.2)
PLATELET # BLD AUTO: 183 10*3/MM3 (ref 140–450)
PMV BLD AUTO: 7.6 FL (ref 6–12)
RBC # BLD AUTO: 4.46 10*6/MM3 (ref 4.14–5.8)
WBC # BLD AUTO: 8.6 10*3/MM3 (ref 3.4–10.8)

## 2021-10-18 PROCEDURE — 80048 BASIC METABOLIC PNL TOTAL CA: CPT | Performed by: EMERGENCY MEDICINE

## 2021-10-18 PROCEDURE — 93005 ELECTROCARDIOGRAM TRACING: CPT | Performed by: EMERGENCY MEDICINE

## 2021-10-18 PROCEDURE — 85025 COMPLETE CBC W/AUTO DIFF WBC: CPT | Performed by: EMERGENCY MEDICINE

## 2021-10-18 PROCEDURE — 83735 ASSAY OF MAGNESIUM: CPT | Performed by: EMERGENCY MEDICINE

## 2021-10-18 PROCEDURE — 99284 EMERGENCY DEPT VISIT MOD MDM: CPT

## 2021-10-18 PROCEDURE — 71045 X-RAY EXAM CHEST 1 VIEW: CPT

## 2021-10-18 PROCEDURE — 84484 ASSAY OF TROPONIN QUANT: CPT | Performed by: EMERGENCY MEDICINE

## 2021-10-19 ENCOUNTER — APPOINTMENT (OUTPATIENT)
Dept: RESPIRATORY THERAPY | Facility: HOSPITAL | Age: 71
End: 2021-10-19

## 2021-10-19 VITALS
OXYGEN SATURATION: 96 % | RESPIRATION RATE: 14 BRPM | TEMPERATURE: 97.9 F | WEIGHT: 230 LBS | BODY MASS INDEX: 31.15 KG/M2 | SYSTOLIC BLOOD PRESSURE: 120 MMHG | DIASTOLIC BLOOD PRESSURE: 66 MMHG | HEIGHT: 72 IN | HEART RATE: 94 BPM

## 2021-10-19 PROBLEM — I44.2 COMPLETE HEART BLOCK (HCC): Status: ACTIVE | Noted: 2021-10-19

## 2021-10-19 PROBLEM — Z95.0 PRESENCE OF CARDIAC PACEMAKER: Status: ACTIVE | Noted: 2021-10-19

## 2021-10-19 PROBLEM — R42 LIGHTHEADED: Status: ACTIVE | Noted: 2021-10-19

## 2021-10-19 LAB
ANION GAP SERPL CALCULATED.3IONS-SCNC: 12 MMOL/L (ref 5–15)
ANION GAP SERPL CALCULATED.3IONS-SCNC: 12 MMOL/L (ref 5–15)
BUN SERPL-MCNC: 19 MG/DL (ref 8–23)
BUN SERPL-MCNC: 20 MG/DL (ref 8–23)
BUN/CREAT SERPL: 19.6 (ref 7–25)
BUN/CREAT SERPL: 20.2 (ref 7–25)
CALCIUM SPEC-SCNC: 8.8 MG/DL (ref 8.6–10.5)
CALCIUM SPEC-SCNC: 9 MG/DL (ref 8.6–10.5)
CHLORIDE SERPL-SCNC: 107 MMOL/L (ref 98–107)
CHLORIDE SERPL-SCNC: 107 MMOL/L (ref 98–107)
CO2 SERPL-SCNC: 23 MMOL/L (ref 22–29)
CO2 SERPL-SCNC: 24 MMOL/L (ref 22–29)
CREAT SERPL-MCNC: 0.94 MG/DL (ref 0.76–1.27)
CREAT SERPL-MCNC: 1.02 MG/DL (ref 0.76–1.27)
DEPRECATED RDW RBC AUTO: 43.3 FL (ref 37–54)
ERYTHROCYTE [DISTWIDTH] IN BLOOD BY AUTOMATED COUNT: 13 % (ref 12.3–15.4)
GFR SERPL CREATININE-BSD FRML MDRD: 72 ML/MIN/1.73
GFR SERPL CREATININE-BSD FRML MDRD: 79 ML/MIN/1.73
GLUCOSE SERPL-MCNC: 115 MG/DL (ref 65–99)
GLUCOSE SERPL-MCNC: 157 MG/DL (ref 65–99)
HCT VFR BLD AUTO: 39.7 % (ref 37.5–51)
HGB BLD-MCNC: 13.9 G/DL (ref 13–17.7)
HOLD SPECIMEN: NORMAL
MAGNESIUM SERPL-MCNC: 2 MG/DL (ref 1.6–2.4)
MCH RBC QN AUTO: 32.5 PG (ref 26.6–33)
MCHC RBC AUTO-ENTMCNC: 34.9 G/DL (ref 31.5–35.7)
MCV RBC AUTO: 93.1 FL (ref 79–97)
PLATELET # BLD AUTO: 164 10*3/MM3 (ref 140–450)
PMV BLD AUTO: 8.1 FL (ref 6–12)
POTASSIUM SERPL-SCNC: 4.2 MMOL/L (ref 3.5–5.2)
POTASSIUM SERPL-SCNC: 4.3 MMOL/L (ref 3.5–5.2)
QT INTERVAL: 438 MS
RBC # BLD AUTO: 4.27 10*6/MM3 (ref 4.14–5.8)
SARS-COV-2 RNA PNL SPEC NAA+PROBE: NOT DETECTED
SODIUM SERPL-SCNC: 142 MMOL/L (ref 136–145)
SODIUM SERPL-SCNC: 143 MMOL/L (ref 136–145)
TROPONIN T SERPL-MCNC: <0.01 NG/ML (ref 0–0.03)
TROPONIN T SERPL-MCNC: <0.01 NG/ML (ref 0–0.03)
TSH SERPL DL<=0.05 MIU/L-ACNC: 3.02 UIU/ML (ref 0.27–4.2)
WBC # BLD AUTO: 7.5 10*3/MM3 (ref 3.4–10.8)
WHOLE BLOOD HOLD SPECIMEN: NORMAL

## 2021-10-19 PROCEDURE — G0378 HOSPITAL OBSERVATION PER HR: HCPCS

## 2021-10-19 PROCEDURE — 84484 ASSAY OF TROPONIN QUANT: CPT | Performed by: EMERGENCY MEDICINE

## 2021-10-19 PROCEDURE — 99204 OFFICE O/P NEW MOD 45 MIN: CPT | Performed by: INTERNAL MEDICINE

## 2021-10-19 PROCEDURE — C9803 HOPD COVID-19 SPEC COLLECT: HCPCS

## 2021-10-19 PROCEDURE — 84443 ASSAY THYROID STIM HORMONE: CPT | Performed by: NURSE PRACTITIONER

## 2021-10-19 PROCEDURE — 80048 BASIC METABOLIC PNL TOTAL CA: CPT | Performed by: EMERGENCY MEDICINE

## 2021-10-19 PROCEDURE — 87635 SARS-COV-2 COVID-19 AMP PRB: CPT | Performed by: EMERGENCY MEDICINE

## 2021-10-19 PROCEDURE — 85027 COMPLETE CBC AUTOMATED: CPT | Performed by: EMERGENCY MEDICINE

## 2021-10-19 RX ORDER — DILTIAZEM HYDROCHLORIDE 120 MG/1
120 CAPSULE, COATED, EXTENDED RELEASE ORAL
Status: DISCONTINUED | OUTPATIENT
Start: 2021-10-19 | End: 2021-10-19 | Stop reason: HOSPADM

## 2021-10-19 RX ORDER — ACETAMINOPHEN 650 MG/1
650 SUPPOSITORY RECTAL EVERY 4 HOURS PRN
Status: DISCONTINUED | OUTPATIENT
Start: 2021-10-19 | End: 2021-10-19 | Stop reason: HOSPADM

## 2021-10-19 RX ORDER — MORPHINE SULFATE 2 MG/ML
1 INJECTION, SOLUTION INTRAMUSCULAR; INTRAVENOUS EVERY 4 HOURS PRN
Status: DISCONTINUED | OUTPATIENT
Start: 2021-10-19 | End: 2021-10-19 | Stop reason: HOSPADM

## 2021-10-19 RX ORDER — NITROGLYCERIN 0.4 MG/1
0.4 TABLET SUBLINGUAL
Status: DISCONTINUED | OUTPATIENT
Start: 2021-10-19 | End: 2021-10-19 | Stop reason: HOSPADM

## 2021-10-19 RX ORDER — ACETAMINOPHEN 160 MG/5ML
650 SOLUTION ORAL EVERY 4 HOURS PRN
Status: DISCONTINUED | OUTPATIENT
Start: 2021-10-19 | End: 2021-10-19 | Stop reason: HOSPADM

## 2021-10-19 RX ORDER — NALOXONE HCL 0.4 MG/ML
0.4 VIAL (ML) INJECTION
Status: DISCONTINUED | OUTPATIENT
Start: 2021-10-19 | End: 2021-10-19 | Stop reason: HOSPADM

## 2021-10-19 RX ORDER — ONDANSETRON 2 MG/ML
4 INJECTION INTRAMUSCULAR; INTRAVENOUS EVERY 6 HOURS PRN
Status: DISCONTINUED | OUTPATIENT
Start: 2021-10-19 | End: 2021-10-19 | Stop reason: HOSPADM

## 2021-10-19 RX ORDER — ONDANSETRON 4 MG/1
4 TABLET, FILM COATED ORAL EVERY 6 HOURS PRN
Status: DISCONTINUED | OUTPATIENT
Start: 2021-10-19 | End: 2021-10-19 | Stop reason: HOSPADM

## 2021-10-19 RX ORDER — SODIUM CHLORIDE 0.9 % (FLUSH) 0.9 %
10 SYRINGE (ML) INJECTION EVERY 12 HOURS SCHEDULED
Status: DISCONTINUED | OUTPATIENT
Start: 2021-10-19 | End: 2021-10-19 | Stop reason: HOSPADM

## 2021-10-19 RX ORDER — SODIUM CHLORIDE 0.9 % (FLUSH) 0.9 %
10 SYRINGE (ML) INJECTION AS NEEDED
Status: DISCONTINUED | OUTPATIENT
Start: 2021-10-19 | End: 2021-10-19 | Stop reason: HOSPADM

## 2021-10-19 RX ORDER — LOSARTAN POTASSIUM 50 MG/1
50 TABLET ORAL DAILY
Status: DISCONTINUED | OUTPATIENT
Start: 2021-10-19 | End: 2021-10-19 | Stop reason: HOSPADM

## 2021-10-19 RX ORDER — METOPROLOL TARTRATE 50 MG/1
50 TABLET, FILM COATED ORAL EVERY 12 HOURS SCHEDULED
Status: DISCONTINUED | OUTPATIENT
Start: 2021-10-19 | End: 2021-10-19

## 2021-10-19 RX ORDER — DILTIAZEM HYDROCHLORIDE 120 MG/1
120 CAPSULE, COATED, EXTENDED RELEASE ORAL DAILY
Qty: 30 CAPSULE | Refills: 2 | Status: SHIPPED | OUTPATIENT
Start: 2021-10-19 | End: 2023-01-03

## 2021-10-19 RX ORDER — ATORVASTATIN CALCIUM 10 MG/1
10 TABLET, FILM COATED ORAL NIGHTLY
Status: DISCONTINUED | OUTPATIENT
Start: 2021-10-19 | End: 2021-10-19 | Stop reason: HOSPADM

## 2021-10-19 RX ORDER — ACETAMINOPHEN 325 MG/1
650 TABLET ORAL EVERY 4 HOURS PRN
Status: DISCONTINUED | OUTPATIENT
Start: 2021-10-19 | End: 2021-10-19 | Stop reason: HOSPADM

## 2021-10-19 RX ADMIN — RIVAROXABAN 20 MG: 20 TABLET, FILM COATED ORAL at 17:12

## 2021-10-19 RX ADMIN — ATORVASTATIN CALCIUM 10 MG: 10 TABLET, FILM COATED ORAL at 20:36

## 2021-10-19 RX ADMIN — Medication 10 ML: at 04:17

## 2021-10-19 RX ADMIN — MAGNESIUM GLUCONATE 500 MG ORAL TABLET 400 MG: 500 TABLET ORAL at 15:00

## 2021-10-19 RX ADMIN — Medication 10 ML: at 15:57

## 2021-10-19 RX ADMIN — Medication 10 ML: at 08:36

## 2021-10-19 RX ADMIN — DILTIAZEM HYDROCHLORIDE 120 MG: 120 CAPSULE, COATED, EXTENDED RELEASE ORAL at 20:36

## 2021-10-19 RX ADMIN — LOSARTAN POTASSIUM 50 MG: 50 TABLET, FILM COATED ORAL at 15:00

## 2021-10-19 NOTE — CASE MANAGEMENT/SOCIAL WORK
Discharge Planning Assessment   Familia     Patient Name: Leroy Mccrary  MRN: [id number]  Today's Date: 10/19/2021    Admit Date: 10/18/2021     Discharge Needs Assessment     Row Name 10/19/21 1251       Living Environment    Lives With spouse    Name(s) of Who Lives With Patient Gabby    Current Living Arrangements home/apartment/condo    Primary Care Provided by self    Provides Primary Care For no one    Family Caregiver if Needed spouse    Quality of Family Relationships unable to assess    Able to Return to Prior Arrangements yes       Resource/Environmental Concerns    Resource/Environmental Concerns none       Transition Planning    Patient/Family Anticipates Transition to home    Patient/Family Anticipated Services at Transition none    Transportation Anticipated family or friend will provide       Discharge Needs Assessment    Equipment Currently Used at Home none    Concerns to be Addressed denies needs/concerns at this time    Anticipated Changes Related to Illness none    Equipment Needed After Discharge none    Provided Post Acute Provider List? N/A    Provided Post Acute Provider Quality & Resource List? N/A               Discharge Plan     Row Name 10/19/21 1896       Plan    Plan Home    Provided Post Acute Provider List? N/A    Provided Post Acute Provider Quality & Resource List? N/A    Patient/Family in Agreement with Plan yes    Plan Comments Spoke to pt at bedside. Denies D/C needs at this time. PCP and Pharmacy confirmed.              Continued Care and Services - Admitted Since 10/18/2021    Coordination has not been started for this encounter.          Demographic Summary     Row Name 10/19/21 1250       General Information    Admission Type observation    Arrived From emergency department    Required Notices Provided Observation Status Notice    Referral Source admission list    Reason for Consult discharge planning    Preferred Language English               Functional Status     Row  Name 10/19/21 1251       Functional Status    Usual Activity Tolerance good    Current Activity Tolerance good       Functional Status, IADL    Medications independent    Meal Preparation independent    Housekeeping independent    Laundry independent    Shopping independent       Mental Status    General Appearance WDL WDL       Mental Status Summary    Recent Changes in Mental Status/Cognitive Functioning no changes       Employment/    Employment Status retired                         Danay Haro, RN    Met with patient in room wearing PPE: mask, face shield/goggles, gloves, gown.      Maintained distance greater than six feet and spent less than 15 minutes in the room.

## 2021-10-19 NOTE — PLAN OF CARE
Problem: Adult Inpatient Plan of Care  Goal: Plan of Care Review  Outcome: Ongoing, Not Progressing  Flowsheets (Taken 10/19/2021 0526)  Progress: no change  Plan of Care Reviewed With:   patient   spouse  Outcome Summary: new admit for bigeminy and lightheadedness- no dizziness or lightheadedness at this time, pt is stable walking on his own at this time, no complaints, VSS, paced bigeminy on the monitor, will monitor  Goal: Patient-Specific Goal (Individualized)  Outcome: Ongoing, Not Progressing  Goal: Absence of Hospital-Acquired Illness or Injury  Outcome: Ongoing, Not Progressing  Intervention: Identify and Manage Fall Risk  Recent Flowsheet Documentation  Taken 10/19/2021 0507 by Marjorie Latham RN  Safety Promotion/Fall Prevention: safety round/check completed  Taken 10/19/2021 0345 by Marjorie Latham RN  Safety Promotion/Fall Prevention:   safety round/check completed   assistive device/personal items within reach   clutter free environment maintained   lighting adjusted   nonskid shoes/slippers when out of bed   room organization consistent  Intervention: Prevent Skin Injury  Recent Flowsheet Documentation  Taken 10/19/2021 0345 by Marjorie Latham, RN  Body Position:   supine   sitting up in bed   position changed independently   position maintained  Goal: Optimal Comfort and Wellbeing  Outcome: Ongoing, Not Progressing  Intervention: Provide Person-Centered Care  Recent Flowsheet Documentation  Taken 10/19/2021 0345 by Marjorie Latham RN  Trust Relationship/Rapport:   care explained   choices provided   emotional support provided   empathic listening provided   questions answered   questions encouraged   reassurance provided   thoughts/feelings acknowledged  Goal: Readiness for Transition of Care  Outcome: Ongoing, Not Progressing  Intervention: Mutually Develop Transition Plan  Recent Flowsheet Documentation  Taken 10/19/2021 0433 by Marjorie Latham RN  Transportation Anticipated: family or friend  will provide  Patient/Family Anticipated Services at Transition: none  Patient/Family Anticipates Transition to: home with family  Taken 10/19/2021 0431 by Marjorie Latham, RN  Equipment Currently Used at Home: none     Problem: Hypertension Comorbidity  Goal: Blood Pressure in Desired Range  Outcome: Ongoing, Not Progressing  Intervention: Maintain Hypertension-Management Strategies  Recent Flowsheet Documentation  Taken 10/19/2021 0345 by Marjorie Latham, RN  Medication Review/Management: medications reviewed     Problem: Obstructive Sleep Apnea Risk or Actual (Comorbidity Management)  Goal: Unobstructed Breathing During Sleep  Outcome: Ongoing, Not Progressing     Problem: Arrhythmia/Dysrhythmia  Goal: Normalized Cardiac Rhythm  Outcome: Ongoing, Not Progressing   Goal Outcome Evaluation:  Plan of Care Reviewed With: patient, spouse        Progress: no change  Outcome Summary: new admit for bigeminy and lightheadedness- no dizziness or lightheadedness at this time, pt is stable walking on his own at this time, no complaints, VSS, paced bigeminy on the monitor, will monitor

## 2021-10-19 NOTE — ED PROVIDER NOTES
Subjective   Complaint lightheaded dizzy heart rate 40    History of present illness a 70-year-old male with a history of atrial flutter in the past and pacemaker placement several years ago who states that he went to have that interrogated today he states that after that was done he felt strange and this is never happened to him before.  But ever since then he has been lightheaded and dizzy.  Denies any chest pain neck arm or jaw pain no shortness of breath no headache visual change speech difficulty or paralysis.  Limbs are mild to moderate nothing makes it better or worse ongoing continuous since this afternoon.  No other complaints or associated symptoms          Review of Systems   Constitutional: Negative for chills and fever.   HENT: Negative for congestion and sinus pressure.    Eyes: Negative for photophobia and visual disturbance.   Respiratory: Negative for chest tightness and shortness of breath.    Cardiovascular: Negative for chest pain and palpitations.   Gastrointestinal: Negative for abdominal pain and vomiting.   Endocrine: Negative for cold intolerance and heat intolerance.   Genitourinary: Negative for difficulty urinating and dysuria.   Musculoskeletal: Negative for arthralgias and back pain.   Skin: Negative for color change and rash.   Neurological: Positive for dizziness and light-headedness. Negative for facial asymmetry, speech difficulty, weakness and headaches.   Psychiatric/Behavioral: Negative for agitation and behavioral problems.       Past Medical History:   Diagnosis Date   • Atrial flutter (CMS/HCC)    • Hypertension    • Sleep apnea        Allergies   Allergen Reactions   • Bextra [Valdecoxib] Unknown - Low Severity       Past Surgical History:   Procedure Laterality Date   • APPENDECTOMY     • CARDIAC CATHETERIZATION     • COLONOSCOPY N/A 5/5/2021    Procedure: COLONOSCOPY with 1 hot and 2 cold polyps with clip placement in sigmoid;  Surgeon: Mayco Bui MD;   Location: [facility] ENDOSCOPY;  Service: Gastroenterology;  Laterality: N/A;  colon polyps, internal hemorrhoids   • EYE SURGERY     • PACEMAKER IMPLANTATION      x2       No family history on file.    Social History     Socioeconomic History   • Marital status:    Tobacco Use   • Smoking status: Former Smoker     Quit date:      Years since quittin.8   • Smokeless tobacco: Never Used   Substance and Sexual Activity   • Alcohol use: Not Currently   • Drug use: Never     Prior to Admission medications    Medication Sig Start Date End Date Taking? Authorizing Provider   atorvastatin (LIPITOR) 10 MG tablet Take 10 mg by mouth Daily.    Afia Choe MD   chlorproMAZINE (THORAZINE) 25 MG tablet Take 1 tablet by mouth 3 (Three) Times a Day As Needed for Nausea (Hiccups). 21   Karlos Barnes MD   finasteride (PROSCAR) 5 MG tablet Take 5 mg by mouth Daily.    Afia Choe MD   losartan (COZAAR) 50 MG tablet Take 50 mg by mouth Daily.    Afia Choe MD   magnesium oxide (MAG-OX) 400 MG tablet Take 400 mg by mouth Daily.    Afia Choe MD   metoprolol tartrate (LOPRESSOR) 50 MG tablet Take 50 mg by mouth 2 (Two) Times a Day.    Afia Choe MD   niacin ER (Slo-Niacin) 750 MG tablet controlled-release tablet Take 750 mg by mouth 2 (two) times a day.    Afia Choe MD   rivaroxaban (XARELTO) 20 MG tablet Take 20 mg by mouth Daily. HOLD 48 per gsi    Afia Choe MD   tamsulosin (FLOMAX) 0.4 MG capsule 24 hr capsule Take 1 capsule by mouth Daily.    Afia Choe MD           Objective   Physical Exam  70-year-old male awake alert no acute distress but on a monitor he is in bigeminy with a heart rate in the 80s.  HEENT extraocular muscle intact pupils equal and reactive there is no photophobia neck supple no adenopathy no JVD no bruits lungs clear no retractions heart regular without murmur rub abdomen soft without tenderness good bowel  sounds no peritoneal findings or pulsatile mass or bruits extremities pulses are equal throughout upper and lower extremities no edema cords or Homans' sign or evidence of DVT skin warm dry without rashes or cellulitic change neurologic awake alert follows commands motor strength normal without focal weakness no facial asymmetry normal speech  Procedures           ED Course      Results for orders placed or performed during the hospital encounter of 10/18/21   COVID-19,CEPHEID/RYAN/BDMAX,COR/MARGARET/PAD/SHAYY IN-HOUSE(OR EMERGENT/ADD-ON),NP SWAB IN TRANSPORT MEDIA 3-4 HR TAT, RT-PCR - Swab, Nasopharynx    Specimen: Nasopharynx; Swab   Result Value Ref Range    COVID19 Not Detected Not Detected - Ref. Range   Basic Metabolic Panel    Specimen: Blood   Result Value Ref Range    Glucose 157 (H) 65 - 99 mg/dL    BUN 20 8 - 23 mg/dL    Creatinine 1.02 0.76 - 1.27 mg/dL    Sodium 142 136 - 145 mmol/L    Potassium 4.2 3.5 - 5.2 mmol/L    Chloride 107 98 - 107 mmol/L    CO2 23.0 22.0 - 29.0 mmol/L    Calcium 8.8 8.6 - 10.5 mg/dL    eGFR Non African Amer 72 >60 mL/min/1.73    BUN/Creatinine Ratio 19.6 7.0 - 25.0    Anion Gap 12.0 5.0 - 15.0 mmol/L   Troponin    Specimen: Blood   Result Value Ref Range    Troponin T <0.010 0.000 - 0.030 ng/mL   Magnesium    Specimen: Blood   Result Value Ref Range    Magnesium 2.0 1.6 - 2.4 mg/dL   CBC Auto Differential    Specimen: Blood   Result Value Ref Range    WBC 8.60 3.40 - 10.80 10*3/mm3    RBC 4.46 4.14 - 5.80 10*6/mm3    Hemoglobin 14.4 13.0 - 17.7 g/dL    Hematocrit 41.9 37.5 - 51.0 %    MCV 93.9 79.0 - 97.0 fL    MCH 32.3 26.6 - 33.0 pg    MCHC 34.4 31.5 - 35.7 g/dL    RDW 13.2 12.3 - 15.4 %    RDW-SD 44.2 37.0 - 54.0 fl    MPV 7.6 6.0 - 12.0 fL    Platelets 183 140 - 450 10*3/mm3    Neutrophil % 64.0 42.7 - 76.0 %    Lymphocyte % 23.8 19.6 - 45.3 %    Monocyte % 8.1 5.0 - 12.0 %    Eosinophil % 3.5 0.3 - 6.2 %    Basophil % 0.6 0.0 - 1.5 %    Neutrophils, Absolute 5.50 1.70 - 7.00  10*3/mm3    Lymphocytes, Absolute 2.00 0.70 - 3.10 10*3/mm3    Monocytes, Absolute 0.70 0.10 - 0.90 10*3/mm3    Eosinophils, Absolute 0.30 0.00 - 0.40 10*3/mm3    Basophils, Absolute 0.10 0.00 - 0.20 10*3/mm3    nRBC 0.1 0.0 - 0.2 /100 WBC   ECG 12 Lead   Result Value Ref Range    QT Interval 438 ms   Gold Top - SST   Result Value Ref Range    Extra Tube Hold for add-ons.    Light Blue Top   Result Value Ref Range    Extra Tube hold for add-on      No radiology results for the last day  Medications   sodium chloride 0.9 % flush 10 mL (has no administration in time range)          EKG my interpretation ventricular paced complexes ventricular bigeminy PVCs left bundle branch block QTC of 508 abnormal EKG                                  MDM  Number of Diagnoses or Management Options  Bigeminy: new and requires workup  Lightheaded: new and requires workup  Diagnosis management comments: Decision making.  Patient IV established placed on monitor and had the above exam evaluation.  He was in bigeminy on the monitor.  Heart rate at triage was registered 41 but I suspect he was in bigeminy at that time.  The patient had his pacemaker interrogated which they report is working appropriately.  Patient did not noted to have bigeminy.  The patient has a lot of PVCs here and and runs of bigeminy but his blood pressure is good and he is remained stable hemodynamically.  Labs CBC electrolytes and magnesium troponin chest x-ray all unremarkable reviewed by me EKG showed pacer spikes and some bigeminy and PVCs.  I talked to the patient's cardiologist nurse practitioner on-call for Dr. Carvajal.  It was recommended to keep him under observation and have the EP see the patient in the morning.  The patient remained stable was made aware of the findings I see no evidence of acute myocardial infarction or ischemia or DVT or pulmonary embolism or aortic dissection.  The patient be placed in observation for EP consultation the morning stable  unremarkable ER course.       Amount and/or Complexity of Data Reviewed  Clinical lab tests: reviewed  Tests in the radiology section of CPT®: reviewed  Independent visualization of images, tracings, or specimens: yes    Risk of Complications, Morbidity, and/or Mortality  Presenting problems: high  Diagnostic procedures: high  Management options: high        Final diagnoses:   Lightheaded   Bigeminy       ED Disposition  ED Disposition     ED Disposition Condition Comment    Decision to Admit            No follow-up provider specified.       Medication List      No changes were made to your prescriptions during this visit.          Karlos Barnes MD  10/19/21 0333

## 2021-10-19 NOTE — PLAN OF CARE
Goal Outcome Evaluation:  Plan of Care Reviewed With: patient        Progress: improving  Outcome Summary: Pt has had no complaints today. Awaiting cardiology EP consult. Will continue to monitor.

## 2021-10-19 NOTE — H&P
Former[facility] Observation Unit H&P    Patient Name: Leroy Mccrary  : 1950  MRN: [id number]  Primary Care Physician: Sydnie Rodriguez APRN  Date of admission: 10/18/2021     Patient Care Team:  Sydnie Rodriguez APRN as PCP - General (Nurse Practitioner)          Subjective   History Present Illness     Chief Complaint:   Chief Complaint   Patient presents with   • Dizziness         Mr. Mccrary is a 70 y.o.  presents to [facility] complaining of dizziness.       70-year-old male presents to the ER with a chief complaint of dizziness and low heart rate after having his annual pacemaker interrogated in his cardiologist office earlier in the day on 10/18/2021.  The patient was having no symptoms prior to the procedure but after returning home noted that he felt lightheaded.  The patient checked his blood pressure on his home electronic machine with normal blood pressure but heart rate reading in the 40s.  The patient found this odd because his pacemaker is set at a minimum rate of 60 and wondered if they had forgot to turn his pacemaker back on.  As he continued to have episodes of dizziness the patient came to the emergency room for evaluation.      Review of Systems   Cardiovascular: Positive for irregular heartbeat.   Neurological: Positive for dizziness.   All other systems reviewed and are negative.          Personal History     Past Medical History:   Past Medical History:   Diagnosis Date   • Atrial flutter (HCC)    • Elevated cholesterol    • H/O atrial flutter    • Hyperlipidemia    • Hypertension    • Sleep apnea     mild, does not need CPAP       Surgical History:      Past Surgical History:   Procedure Laterality Date   • APPENDECTOMY     • CARDIAC CATHETERIZATION      none in the last 3 years ago   • COLONOSCOPY N/A 2021    Procedure: COLONOSCOPY with 1 hot and 2 cold polyps with clip placement in sigmoid;  Surgeon: Mayco Bui MD;  Location: [facility] ENDOSCOPY;  Service:  Gastroenterology;  Laterality: N/A;  colon polyps, internal hemorrhoids   • 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 Otherwise pertinent FHx was reviewed and unremarkable.     Social History:  reports that he quit smoking about 24 years ago. He has never used smokeless tobacco. He reports previous alcohol use. He reports that he does not use drugs.      Medications:  Prior to Admission medications    Medication Sig Start Date End Date Taking? Authorizing Provider   atorvastatin (LIPITOR) 10 MG tablet Take 10 mg by mouth Daily.    Afia Choe MD   chlorproMAZINE (THORAZINE) 25 MG tablet Take 1 tablet by mouth 3 (Three) Times a Day As Needed for Nausea (Hiccups). 5/17/21   Karlos Barnes MD   finasteride (PROSCAR) 5 MG tablet Take 5 mg by mouth Daily.    Afia Choe MD   losartan (COZAAR) 50 MG tablet Take 50 mg by mouth Daily.    Afia Choe MD   magnesium oxide (MAG-OX) 400 MG tablet Take 400 mg by mouth Daily.    Afia Choe MD   metoprolol tartrate (LOPRESSOR) 50 MG tablet Take 50 mg by mouth 2 (Two) Times a Day.    Afia Choe MD   niacin ER (Slo-Niacin) 750 MG tablet controlled-release tablet Take 750 mg by mouth 2 (two) times a day.    Afia Choe MD   rivaroxaban (XARELTO) 20 MG tablet Take 20 mg by mouth Daily. HOLD 48 per gsi    Afia Choe MD   tamsulosin (FLOMAX) 0.4 MG capsule 24 hr capsule Take 1 capsule by mouth Daily.    Afia Choe MD       Allergies:    Allergies   Allergen Reactions   • Bextra [Valdecoxib] Unknown - Low Severity       Objective   Objective     Vital Signs  Temp:  [97.7 °F (36.5 °C)-98.2 °F (36.8 °C)] 98 °F (36.7 °C)  Heart Rate:  [41-82] 68  Resp:  [14-18] 14  BP: (110-144)/(45-76) 130/71  SpO2:  [96 %-98 %] 97 %  on   ;   Device (Oxygen Therapy): room  air  Body mass index is 31.19 kg/m².    Physical Exam  Vitals and nursing note reviewed.   Constitutional:       Appearance: Normal appearance.   HENT:      Head: Normocephalic and atraumatic.      Right Ear: External ear normal.      Left Ear: External ear normal.      Nose: Nose normal. No congestion or rhinorrhea.      Mouth/Throat:      Mouth: Mucous membranes are moist.   Eyes:      General: No scleral icterus.        Right eye: No discharge.         Left eye: No discharge.      Extraocular Movements: Extraocular movements intact.      Conjunctiva/sclera: Conjunctivae normal.      Pupils: Pupils are equal, round, and reactive to light.   Cardiovascular:      Rate and Rhythm: Normal rate. Rhythm irregular.      Pulses: Normal pulses.      Heart sounds: Normal heart sounds.   Pulmonary:      Effort: Pulmonary effort is normal.      Breath sounds: Normal breath sounds.   Abdominal:      General: Bowel sounds are normal.      Palpations: Abdomen is soft.   Musculoskeletal:         General: Normal range of motion.      Cervical back: Normal range of motion.      Right lower leg: No edema.      Left lower leg: No edema.   Skin:     General: Skin is warm and dry.      Capillary Refill: Capillary refill takes less than 2 seconds.   Neurological:      General: No focal deficit present.      Mental Status: He is alert and oriented to person, place, and time.   Psychiatric:         Mood and Affect: Mood normal.         Behavior: Behavior normal.         Thought Content: Thought content normal.         Judgment: Judgment normal.           Results Review:  I have personally reviewed most recent cardiac tracings, lab results and radiology images and interpretations and agree with findings.    Results from last 7 days   Lab Units 10/19/21  0519   WBC 10*3/mm3 7.50   HEMOGLOBIN g/dL 13.9   HEMATOCRIT % 39.7   PLATELETS 10*3/mm3 164     Results from last 7 days   Lab Units 10/19/21  0519 10/18/21  2338 10/18/21  2338   SODIUM  mmol/L 143   < > 142   POTASSIUM mmol/L 4.3   < > 4.2   CHLORIDE mmol/L 107   < > 107   CO2 mmol/L 24.0   < > 23.0   BUN mg/dL 19   < > 20   CREATININE mg/dL 0.94   < > 1.02   GLUCOSE mg/dL 115*   < > 157*   CALCIUM mg/dL 9.0   < > 8.8   TROPONIN T ng/mL <0.010  --  <0.010    < > = values in this interval not displayed.     Estimated Creatinine Clearance: 89.9 mL/min (by C-G formula based on SCr of 0.94 mg/dL).  Brief Urine Lab Results     None          Microbiology Results (last 10 days)     Procedure Component Value - Date/Time    COVID PRE-OP / PRE-PROCEDURE SCREENING ORDER (NO ISOLATION) - Swab, Nasopharynx [[id number]]  (Normal) Collected: 10/19/21 0207    Lab Status: Final result Specimen: Swab from Nasopharynx Updated: 10/19/21 0320    Narrative:      The following orders were created for panel order COVID PRE-OP / PRE-PROCEDURE SCREENING ORDER (NO ISOLATION) - Swab, Nasopharynx.  Procedure                               Abnormality         Status                     ---------                               -----------         ------                     COVID-19,CEPHEID/RYAN/BD...[[id number]]  Normal              Final result                 Please view results for these tests on the individual orders.    COVID-19,CEPHEID/RYAN/BDMAX,COR/MARGARET/PAD/SHAYY IN-HOUSE(OR EMERGENT/ADD-ON),NP SWAB IN TRANSPORT MEDIA 3-4 HR TAT, RT-PCR - Swab, Nasopharynx [[id number]]  (Normal) Collected: 10/19/21 0207    Lab Status: Final result Specimen: Swab from Nasopharynx Updated: 10/19/21 0320     COVID19 Not Detected    Narrative:      Fact sheet for providers: https://www.fda.gov/media/439077/download     Fact sheet for patients: https://www.fda.gov/media/081589/download  Fact sheet for providers: https://www.fda.gov/media/387950/download    Fact sheet for patients: https://www.fda.gov/media/965280/download    Test performed by PCR.          ECG/EMG Results (most recent)     Procedure Component Value Units Date/Time    ECG 12 Lead  [[id number]] Collected: 10/18/21 2320     Updated: 10/18/21 2321     QT Interval 438 ms     Narrative:      HEART RATE= 80  bpm  RR Interval= 748  ms  FL Interval= 257  ms  P Horizontal Axis= 119  deg  P Front Axis= 130  deg  QRSD Interval= 134  ms  QT Interval= 438  ms  QRS Axis= 25  deg  T Wave Axis= 240  deg  - ABNORMAL ECG -  Ventricular-paced complexes  Prolonged FL interval  JOSUE, consider biatrial enlargement  Left bundle branch block  When compared with ECG of 17-May-2021 2:26:10,  New conduction abnormality  Significant axis, voltage or hypertrophy change  Electronically Signed By:   Date and Time of Study: 2021-10-18 23:20:38              Results for orders placed during the hospital encounter of 09/17/19    Adult Transthoracic Echo Complete W/ Cont if Necessary Per Protocol    Interpretation Summary  · Left ventricular wall thickness is consistent with mild concentric hypertrophy.  · Left ventricular systolic function is normal.  · Right ventricular cavity is mild-to-moderately dilated.    Technically marginal study limited acoustical windows though grossly adequate for interpretation.    Aortic valve is trileaflet demonstrating adequate opening coaptation.  Mitral and tricuspid valves both structurally normal with satisfactory diastolic liver excursion.  Pulmonic valve not well visualized.  Right ventricular enlargement noted with satisfactory global contractility.  LV chamber size normal with mild concentric LVH but no segmental wall motion abnormalities LVEF 55 to 60%.  Normal left atrial dimension and aortic root with.  No intracardiac thrombus vegetation or masses identified.    Supplements Doppler exam shows normal aortic and mitral valve flow velocity with no significant regurgitant jets.  Trace TR noted RVSP quantitated 10 mmHg.  No significant markers for diastolic dysfunction.      XR Chest 1 View    Result Date: 10/19/2021  No acute process.  Electronically Signed By-Abe Escalera MD On:10/19/2021  7:17 AM This report was finalized on [id number] by  Abe Escalera MD.        Estimated Creatinine Clearance: 89.9 mL/min (by C-G formula based on SCr of 0.94 mg/dL).    Assessment/Plan   Assessment/Plan       Active Hospital Problems    Diagnosis  POA   • Lightheaded [R42]  Yes      Resolved Hospital Problems   No resolved problems to display.     Dizziness, uncertain etiology--consideration for fluctuating cardiac output due to inherent versus paced beats: Orthostatic vital signs  -Orthostatic vital signs negative  -Dizziness has resolved    Pacemaker with breakthrough adherent beats: Consult electrophysiology cardiologist    Anticoagulation therapy secondary to chronic atrial fibrillation with primarily paced rhythm on monitor: Continue Xarelto;  continue metoprolol    HLD, chronic: Continue Lipitor    Hypertension, chronic: Continue losartan; continue metoprolol      VTE Prophylaxis -   Mechanical Order History:     None      Pharmalogical Order History:      Ordered     Dose Route Frequency Stop    10/19/21 1102  enoxaparin (LOVENOX) syringe 40 mg         40 mg SC Every 24 Hours --                CODE STATUS:    Code Status and Medical Interventions:   Ordered at: 10/19/21 1102     Code Status:    CPR     Medical Interventions (Level of Support Prior to Arrest):    Full       This patient has been examined wearing personal protective equipment.     I discussed the patient's findings and my recommendations with patient and family.      Signature:Electronically signed by LIZZY Aguilar, 10/19/21, 2:56 PM EDT.

## 2021-10-19 NOTE — CONSULTS
HP      Name: Leroy Mccrary ADMIT: 10/18/2021   : 1950  PCP: Sydnie Rodriguez APRN    MRN: [id number] LOS: 0 days   AGE/SEX: 70 y.o. male  ROOM: 109/1     Chief Complaint   Patient presents with   • Dizziness       Subjective         History of present illness  Leroy Mccrary is a 70-year-old male patient who has history of atrial flutter status post ablation around , complete heart block with pacemaker implantation with generator change out about 4 years ago, presents to the ER for evaluation of palpitations.  Patient was at his doctor's office where a device check was performed but ever since then he started developing lightheadedness and feeling of weakness, no syncopal episodes no chest pain no lower extremity edema.  He presented to the ER for further evaluation.  Upon arrival he was found to be in paced rhythm with PVCs.  Patient normally follows with Dr. Carvajal.    Past Medical History:   Diagnosis Date   • Atrial flutter (HCC)    • Elevated cholesterol    • H/O atrial flutter    • Hyperlipidemia    • Hypertension    • Sleep apnea     mild, does not need CPAP     Past Surgical History:   Procedure Laterality Date   • APPENDECTOMY     • CARDIAC CATHETERIZATION      none in the last 3 years ago   • COLONOSCOPY N/A 2021    Procedure: COLONOSCOPY with 1 hot and 2 cold polyps with clip placement in sigmoid;  Surgeon: Mayco Bui MD;  Location: [facility] ENDOSCOPY;  Service: Gastroenterology;  Laterality: N/A;  colon polyps, internal hemorrhoids   • 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 No pertinent family history.  Social History     Tobacco Use   • Smoking status: Former Smoker     Quit date:      Years since quittin.8   • Smokeless tobacco: Never Used   Vaping Use   • Vaping Use: Never used   Substance Use Topics   • Alcohol use: Not Currently      Comment: quit drinking in 1980s    • Drug use: Never     Medications Prior to Admission   Medication Sig Dispense Refill Last Dose   • atorvastatin (LIPITOR) 10 MG tablet Take 10 mg by mouth Daily.      • losartan (COZAAR) 50 MG tablet Take 50 mg by mouth Daily.      • magnesium oxide (MAG-OX) 400 MG tablet Take 400 mg by mouth Daily.      • metoprolol tartrate (LOPRESSOR) 50 MG tablet Take 50 mg by mouth 2 (Two) Times a Day.      • niacin ER (Slo-Niacin) 750 MG tablet controlled-release tablet Take 750 mg by mouth 2 (two) times a day.      • rivaroxaban (XARELTO) 20 MG tablet Take 20 mg by mouth Daily. HOLD 48 per gsi        Allergies:  Bextra [valdecoxib]        Physical Exam  VITALS REVIEWED    General:      well developed, in no acute distress.    Head:      normocephalic and atraumatic.    Eyes:      PERRL/EOM intact, conjunctiva and sclera clear with out nystagmus.    Neck:      no masses, thyromegaly,  trachea central with normal respiratory effort and PMI displaced laterally  Lungs:      Clear to auscultation bilaterally  Heart:       Irregular rhythm  Msk:      no deformity or scoliosis noted of thoracic or lumbar spine.    Pulses:      pulses normal in all 4 extremities.    Extremities:       No lower extremity edema  Neurologic:      no focal deficits.   alert oriented x3  Skin:      intact without lesions or rashes.    Psych:      alert and cooperative; normal mood and affect; normal attention span and concentration.      Result Review :                Pertinent cardiac workup    1. EKG 10/19/2021 showed A sensed V paced rhythm with PVCs  2. Echocardiogram 6/9/2019 ejection fraction 55 to 60%    Assessment and Plan          Lightheaded    Complete heart block (HCC)    Presence of cardiac pacemaker      Leroy Mccrary is a 70-year-old male patient who presented for evaluation of palpitations.  Patient was found to have frequent PVCs which was present on EKG as well as telemetry strips during this visit.   I reviewed all the available rhythm strips, patient was wondering if the device check had anything to do with his symptoms.  I do not believe so as the baseline rate is a sensed V paced with a lower paced rhythm of 60 bpm.  I intend to believe that it is more of a coincidence that he started feeling the symptoms after device check.  Patient also has a history of what seems to be atrial flutter ablation but for now his rhythm is sinus.  For now I recommend switching metoprolol to Cardizem long-acting 120 once a day and he could be discharged home today with a 2-week Holter monitor.  I could see him in the clinic in about a month and if he continues to have PVCs then we could discuss about ablation.  I advised the patient to follow-up with his primary cardiologist Dr. Carvajal as well.      No follow-ups on file.  Patient was given instructions and counseling regarding his condition or for health maintenance advice. Please see specific information pulled into the AVS if appropriate.

## 2021-10-20 NOTE — CASE MANAGEMENT/SOCIAL WORK
Case Management Discharge Note      Final Note: Home    Provided Post Acute Provider List?: N/A  Provided Post Acute Provider Quality & Resource List?: N/A    Selected Continued Care - Discharged on 10/19/2021 Admission date: 10/18/2021 - Discharge disposition: Home or Self Care                 Final Discharge Disposition Code: 01 - home or self-care

## 2021-10-20 NOTE — PLAN OF CARE
Problem: Asthma Comorbidity  Goal: Maintenance of Asthma Control  Outcome: Met     Problem: COPD Comorbidity  Goal: Maintenance of COPD Symptom Control  Outcome: Met     Problem: Diabetes Comorbidity  Goal: Blood Glucose Level Within Desired Range  Outcome: Met     Problem: Heart Failure Comorbidity  Goal: Maintenance of Heart Failure Symptom Control  Outcome: Met     Problem: Hypertension Comorbidity  Goal: Blood Pressure in Desired Range  Outcome: Met     Problem: Obstructive Sleep Apnea Risk or Actual (Comorbidity Management)  Goal: Unobstructed Breathing During Sleep  Outcome: Met     Problem: Pain Chronic (Persistent) (Comorbidity Management)  Goal: Acceptable Pain Control and Functional Ability  Outcome: Met  Intervention: Manage Persistent Pain  Recent Flowsheet Documentation  Taken 10/19/2021 1901 by Ranke, Megan, RN  Bowel Elimination Promotion:   adequate fluid intake promoted   ambulation promoted   privacy promoted  Intervention: Optimize Psychosocial Wellbeing  Recent Flowsheet Documentation  Taken 10/19/2021 1901 by Ranke, Megan, RN  Diversional Activities:   television   smartphone     Problem: Seizure Disorder Comorbidity  Goal: Maintenance of Seizure Control  Outcome: Met     Problem: Syncope  Goal: Absence of Syncopal Symptoms  Outcome: Met   Goal Outcome Evaluation:

## 2021-10-20 NOTE — PLAN OF CARE
Problem: Asthma Comorbidity  Goal: Maintenance of Asthma Control  Outcome: Met     Problem: COPD Comorbidity  Goal: Maintenance of COPD Symptom Control  Outcome: Met     Problem: Diabetes Comorbidity  Goal: Blood Glucose Level Within Desired Range  Outcome: Met     Problem: Heart Failure Comorbidity  Goal: Maintenance of Heart Failure Symptom Control  Outcome: Met     Problem: Hypertension Comorbidity  Goal: Blood Pressure in Desired Range  Outcome: Met     Problem: Obstructive Sleep Apnea Risk or Actual (Comorbidity Management)  Goal: Unobstructed Breathing During Sleep  Outcome: Met     Problem: Pain Chronic (Persistent) (Comorbidity Management)  Goal: Acceptable Pain Control and Functional Ability  Outcome: Met  Intervention: Manage Persistent Pain  Recent Flowsheet Documentation  Taken 10/19/2021 1901 by Ranke, Megan, RN  Bowel Elimination Promotion:   adequate fluid intake promoted   ambulation promoted   privacy promoted  Intervention: Optimize Psychosocial Wellbeing  Recent Flowsheet Documentation  Taken 10/19/2021 1901 by Ranke, Megan, RN  Diversional Activities:   television   smartphone     Problem: Seizure Disorder Comorbidity  Goal: Maintenance of Seizure Control  Outcome: Met   Goal Outcome Evaluation: Pt being discharged home.

## 2021-10-25 NOTE — DISCHARGE SUMMARY
[facility]  DISCHARGE SUMMARY        Prepared For PCP:  Sydnie Rodriguez APRN    Patient Name: Leroy Mccrary  : 1950  MRN: [id number]      Date of Admission:   10/18/2021    Date of Discharge:  10/25/2021    Length of stay:  LOS: 0 days     Hospital Course     Presenting Problem:   Bigeminy [I49.8]  Lightheaded [R42]      Active Hospital Problems    Diagnosis  POA   • Lightheaded [R42]  Yes   • Complete heart block (HCC) [I44.2]  Unknown   • Presence of cardiac pacemaker [Z95.0]  Unknown      Resolved Hospital Problems   No resolved problems to display.           Hospital Course:  Leroy Mccrary is a 71 y.o. male who had symptoms of dizziness and palpitations after having his pacemaker interrogated in the office.  The patient had acute MI ruled out by negative troponin x2.  His symptoms resolved while inpatient.  He was seen by electrophysiology cardiologist without recommendation for further testing.  He will follow-up with cardiology as an outpatient.          Recommendation for Outpatient Providers:             Reasons For Change In Medications and Indications for New Medications:        Day of Discharge     HPI:   70-year-old male presents to the ER with a chief complaint of dizziness and low heart rate after having his annual pacemaker interrogated in his cardiologist office earlier in the day on 10/18/2021.  The patient was having no symptoms prior to the procedure but after returning home noted that he felt lightheaded.  The patient checked his blood pressure on his home electronic machine with normal blood pressure but heart rate reading in the 40s.  The patient found this odd because his pacemaker is set at a minimum rate of 60 and wondered if they had forgot to turn his pacemaker back on.  As he continued to have episodes of dizziness the patient came to the emergency room for evaluation.    Vital Signs:       10/19/21 1825 97.9 (36.6) 94 14 120/66 Lying room air 96          ROS:  Review of Systems   All other systems reviewed and are negative.    Physical Exam  Vitals and nursing note reviewed.   Constitutional:       Appearance: Normal appearance.   HENT:      Head: Normocephalic and atraumatic.      Right Ear: External ear normal.      Left Ear: External ear normal.      Nose: Nose normal. No congestion or rhinorrhea.      Mouth/Throat:      Mouth: Mucous membranes are moist.   Eyes:      General: No scleral icterus.        Right eye: No discharge.         Left eye: No discharge.      Extraocular Movements: Extraocular movements intact.      Conjunctiva/sclera: Conjunctivae normal.      Pupils: Pupils are equal, round, and reactive to light.   Cardiovascular:      Rate and Rhythm: Normal rate. Rhythm irregular.      Pulses: Normal pulses.      Heart sounds: Normal heart sounds.   Pulmonary:      Effort: Pulmonary effort is normal.      Breath sounds: Normal breath sounds.   Abdominal:      General: Bowel sounds are normal.      Palpations: Abdomen is soft.   Musculoskeletal:         General: Normal range of motion.      Cervical back: Normal range of motion.      Right lower leg: No edema.      Left lower leg: No edema.   Skin:     General: Skin is warm and dry.      Capillary Refill: Capillary refill takes less than 2 seconds.   Neurological:      General: No focal deficit present.      Mental Status: He is alert and oriented to person, place, and time.   Psychiatric:         Mood and Affect: Mood normal.         Behavior: Behavior normal.         Thought Content: Thought content normal.         Judgment: Judgment normal.     Pertinent  and/or Most Recent Results     Results from last 7 days   Lab Units 10/19/21  0519 10/18/21  2338   WBC 10*3/mm3 7.50 8.60   HEMOGLOBIN g/dL 13.9 14.4   HEMATOCRIT % 39.7 41.9   PLATELETS 10*3/mm3 164 183   SODIUM mmol/L 143 142   POTASSIUM mmol/L 4.3 4.2   CHLORIDE mmol/L 107 107   CO2 mmol/L 24.0 23.0   BUN mg/dL 19 20   CREATININE mg/dL  0.94 1.02   GLUCOSE mg/dL 115* 157*   CALCIUM mg/dL 9.0 8.8           Invalid input(s): PROT, LABALBU        Invalid input(s): TG, LDLCALC, LDLREALC  Results from last 7 days   Lab Units 10/19/21  0519 10/18/21  2338   TSH uIU/mL 3.020  --    TROPONIN T ng/mL <0.010 <0.010       Brief Urine Lab Results     None          Microbiology Results Abnormal     Procedure Component Value - Date/Time    COVID PRE-OP / PRE-PROCEDURE SCREENING ORDER (NO ISOLATION) - Swab, Nasopharynx [[id number]]  (Normal) Collected: 10/19/21 0207    Lab Status: Final result Specimen: Swab from Nasopharynx Updated: 10/19/21 0320    Narrative:      The following orders were created for panel order COVID PRE-OP / PRE-PROCEDURE SCREENING ORDER (NO ISOLATION) - Swab, Nasopharynx.  Procedure                               Abnormality         Status                     ---------                               -----------         ------                     COVID-19,CEPHEID/RYAN/BD...[[id number]]  Normal              Final result                 Please view results for these tests on the individual orders.    COVID-19,CEPHEID/RYAN/BDMAX,COR/MARGARET/PAD/SHAYY IN-HOUSE(OR EMERGENT/ADD-ON),NP SWAB IN TRANSPORT MEDIA 3-4 HR TAT, RT-PCR - Swab, Nasopharynx [[id number]]  (Normal) Collected: 10/19/21 0207    Lab Status: Final result Specimen: Swab from Nasopharynx Updated: 10/19/21 0320     COVID19 Not Detected    Narrative:      Fact sheet for providers: https://www.fda.gov/media/948033/download     Fact sheet for patients: https://www.fda.gov/media/263713/download  Fact sheet for providers: https://www.fda.gov/media/608678/download    Fact sheet for patients: https://www.fda.gov/media/720617/download    Test performed by PCR.          XR Chest 1 View    Result Date: 10/19/2021  Impression: No acute process.  Electronically Signed By-Abe Escalera MD On:10/19/2021 7:17 AM This report was finalized on [id number] by  Abe Escalera MD.              Results for orders  placed during the hospital encounter of 09/17/19    Adult Transthoracic Echo Complete W/ Cont if Necessary Per Protocol    Interpretation Summary  · Left ventricular wall thickness is consistent with mild concentric hypertrophy.  · Left ventricular systolic function is normal.  · Right ventricular cavity is mild-to-moderately dilated.    Technically marginal study limited acoustical windows though grossly adequate for interpretation.    Aortic valve is trileaflet demonstrating adequate opening coaptation.  Mitral and tricuspid valves both structurally normal with satisfactory diastolic liver excursion.  Pulmonic valve not well visualized.  Right ventricular enlargement noted with satisfactory global contractility.  LV chamber size normal with mild concentric LVH but no segmental wall motion abnormalities LVEF 55 to 60%.  Normal left atrial dimension and aortic root with.  No intracardiac thrombus vegetation or masses identified.    Supplements Doppler exam shows normal aortic and mitral valve flow velocity with no significant regurgitant jets.  Trace TR noted RVSP quantitated 10 mmHg.  No significant markers for diastolic dysfunction.              Test Results Pending at Discharge        Procedures Performed           Consults:   Consults     No orders found from 9/19/2021 to 10/19/2021.            Discharge Details        Discharge Medications      New Medications      Instructions Start Date   dilTIAZem  MG 24 hr capsule  Commonly known as: Cardizem CD   120 mg, Oral, Daily         Continue These Medications      Instructions Start Date   atorvastatin 10 MG tablet  Commonly known as: LIPITOR   10 mg, Oral, Daily      losartan 50 MG tablet  Commonly known as: COZAAR   50 mg, Oral, Daily      magnesium oxide 400 MG tablet  Commonly known as: MAG-OX   400 mg, Oral, Daily      rivaroxaban 20 MG tablet  Commonly known as: XARELTO   20 mg, Oral, Daily, HOLD 48 per gsi       Slo-Niacin 750 MG tablet  controlled-release tablet  Generic drug: niacin ER   750 mg, Oral, 2 times daily         Stop These Medications    metoprolol tartrate 50 MG tablet  Commonly known as: LOPRESSOR            Allergies   Allergen Reactions   • Bextra [Valdecoxib] Unknown - Low Severity         Discharge Disposition:  Home or Self Care    Diet:  Hospital:No active diet order        Discharge Activity:   Activity Instructions    Activity as tolerated.             CODE STATUS:    Code Status and Medical Interventions:   Ordered at: 10/19/21 1102     Code Status:    CPR     Medical Interventions (Level of Support Prior to Arrest):    Full         Follow-up Appointments  Future Appointments   Date Time Provider Department Center   11/23/2021  9:30 AM Harish Kong MD [facility]       Additional Instructions for the Follow-ups that You Need to Schedule    Follow-up with cardiology in 1 month.             Condition on Discharge:      Stable      This patient has been examined wearing appropriate Personal Protective Equipment. 10/25/21      Electronically signed by LIZZY Aguilar, 10/25/21, 4:20 PM EDT.      Time: I spent  60  minutes on this discharge activity which included face-to-face encounter with the patient/reviewing the data in the system/coordination of the care with the nursing staff as well as consultants/documentation/entering orders.

## 2022-12-06 ENCOUNTER — TRANSCRIBE ORDERS (OUTPATIENT)
Dept: ADMINISTRATIVE | Facility: HOSPITAL | Age: 72
End: 2022-12-06

## 2022-12-06 DIAGNOSIS — N13.30 HYDRONEPHROSIS, UNSPECIFIED HYDRONEPHROSIS TYPE: Primary | ICD-10-CM

## 2022-12-13 ENCOUNTER — HOSPITAL ENCOUNTER (OUTPATIENT)
Dept: NUCLEAR MEDICINE | Facility: HOSPITAL | Age: 72
Discharge: HOME | End: 2022-12-13

## 2022-12-13 DIAGNOSIS — N13.30 HYDRONEPHROSIS, UNSPECIFIED HYDRONEPHROSIS TYPE: ICD-10-CM

## 2022-12-13 PROCEDURE — 78708 K FLOW/FUNCT IMAGE W/DRUG: CPT

## 2022-12-13 PROCEDURE — 25010000002 FUROSEMIDE PER 20 MG: Performed by: UROLOGY

## 2022-12-13 PROCEDURE — 0 TECHNETIUM MERTIATIDE: Performed by: UROLOGY

## 2022-12-13 PROCEDURE — A9562 TC99M MERTIATIDE: HCPCS | Performed by: UROLOGY

## 2022-12-13 RX ORDER — FUROSEMIDE 10 MG/ML
20 INJECTION INTRAMUSCULAR; INTRAVENOUS
Status: COMPLETED | OUTPATIENT
Start: 2022-12-13 | End: 2022-12-13

## 2022-12-13 RX ADMIN — FUROSEMIDE 20 MG: 10 INJECTION, SOLUTION INTRAVENOUS at 09:38

## 2022-12-13 RX ADMIN — TECHNESCAN TC 99M MERTIATIDE 1 DOSE: 1 INJECTION, POWDER, LYOPHILIZED, FOR SOLUTION INTRAVENOUS at 09:38

## 2023-01-02 ENCOUNTER — APPOINTMENT (OUTPATIENT)
Dept: GENERAL RADIOLOGY | Facility: HOSPITAL | Age: 73
DRG: 177 | End: 2023-01-02
Payer: MEDICARE

## 2023-01-02 ENCOUNTER — HOSPITAL ENCOUNTER (INPATIENT)
Facility: HOSPITAL | Age: 73
LOS: 3 days | Discharge: HOME | DRG: 177 | End: 2023-01-05
Attending: EMERGENCY MEDICINE | Admitting: STUDENT IN AN ORGANIZED HEALTH CARE EDUCATION/TRAINING PROGRAM
Payer: MEDICARE

## 2023-01-02 DIAGNOSIS — R09.02 HYPOXIA: ICD-10-CM

## 2023-01-02 DIAGNOSIS — R06.03 ACUTE RESPIRATORY DISTRESS: Primary | ICD-10-CM

## 2023-01-02 DIAGNOSIS — J18.9 PNEUMONIA OF RIGHT LOWER LOBE DUE TO INFECTIOUS ORGANISM: ICD-10-CM

## 2023-01-02 DIAGNOSIS — U07.1 COVID-19: ICD-10-CM

## 2023-01-02 PROBLEM — J06.9 ACUTE RESPIRATORY DISEASE DUE TO COVID-19 VIRUS: Status: ACTIVE | Noted: 2023-01-02

## 2023-01-02 LAB
ALBUMIN SERPL-MCNC: 4 G/DL (ref 3.5–5.2)
ALBUMIN/GLOB SERPL: 1.5 G/DL
ALP SERPL-CCNC: 79 U/L (ref 39–117)
ALT SERPL W P-5'-P-CCNC: 20 U/L (ref 1–41)
ANION GAP SERPL CALCULATED.3IONS-SCNC: 13 MMOL/L (ref 5–15)
ARTERIAL PATENCY WRIST A: POSITIVE
AST SERPL-CCNC: 20 U/L (ref 1–40)
ATMOSPHERIC PRESS: ABNORMAL MM[HG]
B PARAPERT DNA SPEC QL NAA+PROBE: NOT DETECTED
B PERT DNA SPEC QL NAA+PROBE: NOT DETECTED
BASE EXCESS BLDA CALC-SCNC: 0.2 MMOL/L (ref 0–3)
BASOPHILS # BLD AUTO: 0 10*3/MM3 (ref 0–0.2)
BASOPHILS NFR BLD AUTO: 0.3 % (ref 0–1.5)
BDY SITE: ABNORMAL
BILIRUB SERPL-MCNC: 0.9 MG/DL (ref 0–1.2)
BUN SERPL-MCNC: 19 MG/DL (ref 8–23)
BUN/CREAT SERPL: 16.2 (ref 7–25)
C PNEUM DNA NPH QL NAA+NON-PROBE: NOT DETECTED
CALCIUM SPEC-SCNC: 9.1 MG/DL (ref 8.6–10.5)
CHLORIDE SERPL-SCNC: 104 MMOL/L (ref 98–107)
CO2 BLDA-SCNC: 23.9 MMOL/L (ref 22–29)
CO2 SERPL-SCNC: 23 MMOL/L (ref 22–29)
CREAT SERPL-MCNC: 1.17 MG/DL (ref 0.76–1.27)
D-LACTATE SERPL-SCNC: 2.2 MMOL/L (ref 0.5–2)
DEPRECATED RDW RBC AUTO: 42.4 FL (ref 37–54)
EGFRCR SERPLBLD CKD-EPI 2021: 65.8 ML/MIN/1.73
EOSINOPHIL # BLD AUTO: 0 10*3/MM3 (ref 0–0.4)
EOSINOPHIL NFR BLD AUTO: 0.3 % (ref 0.3–6.2)
ERYTHROCYTE [DISTWIDTH] IN BLOOD BY AUTOMATED COUNT: 12.3 % (ref 12.3–15.4)
FLUAV SUBTYP SPEC NAA+PROBE: NOT DETECTED
FLUBV RNA ISLT QL NAA+PROBE: NOT DETECTED
GLOBULIN UR ELPH-MCNC: 2.6 GM/DL
GLUCOSE SERPL-MCNC: 129 MG/DL (ref 65–99)
HADV DNA SPEC NAA+PROBE: NOT DETECTED
HCO3 BLDA-SCNC: 23 MMOL/L (ref 21–28)
HCOV 229E RNA SPEC QL NAA+PROBE: NOT DETECTED
HCOV HKU1 RNA SPEC QL NAA+PROBE: NOT DETECTED
HCOV NL63 RNA SPEC QL NAA+PROBE: NOT DETECTED
HCOV OC43 RNA SPEC QL NAA+PROBE: NOT DETECTED
HCT VFR BLD AUTO: 43.7 % (ref 37.5–51)
HEMODILUTION: NO
HGB BLD-MCNC: 14.7 G/DL (ref 13–17.7)
HMPV RNA NPH QL NAA+NON-PROBE: NOT DETECTED
HPIV1 RNA ISLT QL NAA+PROBE: NOT DETECTED
HPIV2 RNA SPEC QL NAA+PROBE: NOT DETECTED
HPIV3 RNA NPH QL NAA+PROBE: NOT DETECTED
HPIV4 P GENE NPH QL NAA+PROBE: NOT DETECTED
INHALED O2 CONCENTRATION: 40 %
LYMPHOCYTES # BLD AUTO: 0.8 10*3/MM3 (ref 0.7–3.1)
LYMPHOCYTES NFR BLD AUTO: 5.6 % (ref 19.6–45.3)
M PNEUMO IGG SER IA-ACNC: NOT DETECTED
MCH RBC QN AUTO: 31.5 PG (ref 26.6–33)
MCHC RBC AUTO-ENTMCNC: 33.7 G/DL (ref 31.5–35.7)
MCV RBC AUTO: 93.3 FL (ref 79–97)
MODALITY: ABNORMAL
MONOCYTES # BLD AUTO: 0.2 10*3/MM3 (ref 0.1–0.9)
MONOCYTES NFR BLD AUTO: 1.4 % (ref 5–12)
NEUTROPHILS NFR BLD AUTO: 13 10*3/MM3 (ref 1.7–7)
NEUTROPHILS NFR BLD AUTO: 92.4 % (ref 42.7–76)
NRBC BLD AUTO-RTO: 0.1 /100 WBC (ref 0–0.2)
NT-PROBNP SERPL-MCNC: 614.6 PG/ML (ref 0–900)
PCO2 BLDA: 31.7 MM HG (ref 35–48)
PH BLDA: 7.47 PH UNITS (ref 7.35–7.45)
PLATELET # BLD AUTO: 213 10*3/MM3 (ref 140–450)
PMV BLD AUTO: 6.9 FL (ref 6–12)
PO2 BLDA: 60.8 MM HG (ref 83–108)
POTASSIUM SERPL-SCNC: 3.9 MMOL/L (ref 3.5–5.2)
PROT SERPL-MCNC: 6.6 G/DL (ref 6–8.5)
RBC # BLD AUTO: 4.68 10*6/MM3 (ref 4.14–5.8)
RHINOVIRUS RNA SPEC NAA+PROBE: NOT DETECTED
RSV RNA NPH QL NAA+NON-PROBE: NOT DETECTED
SAO2 % BLDCOA: 92.7 % (ref 94–98)
SARS-COV-2 RNA NPH QL NAA+NON-PROBE: DETECTED
SODIUM SERPL-SCNC: 140 MMOL/L (ref 136–145)
TROPONIN T SERPL-MCNC: 0.02 NG/ML (ref 0–0.03)
WBC NRBC COR # BLD: 14.1 10*3/MM3 (ref 3.4–10.8)

## 2023-01-02 PROCEDURE — 83605 ASSAY OF LACTIC ACID: CPT

## 2023-01-02 PROCEDURE — XW033E5 INTRODUCTION OF REMDESIVIR ANTI-INFECTIVE INTO PERIPHERAL VEIN, PERCUTANEOUS APPROACH, NEW TECHNOLOGY GROUP 5: ICD-10-PCS | Performed by: FAMILY MEDICINE

## 2023-01-02 PROCEDURE — 93005 ELECTROCARDIOGRAM TRACING: CPT

## 2023-01-02 PROCEDURE — 36600 WITHDRAWAL OF ARTERIAL BLOOD: CPT

## 2023-01-02 PROCEDURE — 71045 X-RAY EXAM CHEST 1 VIEW: CPT

## 2023-01-02 PROCEDURE — 25010000002 CEFTRIAXONE PER 250 MG: Performed by: EMERGENCY MEDICINE

## 2023-01-02 PROCEDURE — 99285 EMERGENCY DEPT VISIT HI MDM: CPT

## 2023-01-02 PROCEDURE — 84484 ASSAY OF TROPONIN QUANT: CPT | Performed by: EMERGENCY MEDICINE

## 2023-01-02 PROCEDURE — 87040 BLOOD CULTURE FOR BACTERIA: CPT | Performed by: EMERGENCY MEDICINE

## 2023-01-02 PROCEDURE — 83880 ASSAY OF NATRIURETIC PEPTIDE: CPT | Performed by: EMERGENCY MEDICINE

## 2023-01-02 PROCEDURE — 85025 COMPLETE CBC W/AUTO DIFF WBC: CPT | Performed by: EMERGENCY MEDICINE

## 2023-01-02 PROCEDURE — 93005 ELECTROCARDIOGRAM TRACING: CPT | Performed by: EMERGENCY MEDICINE

## 2023-01-02 PROCEDURE — 25010000002 DEXAMETHASONE PER 1 MG: Performed by: EMERGENCY MEDICINE

## 2023-01-02 PROCEDURE — 25010000002 AZITHROMYCIN PER 500 MG: Performed by: EMERGENCY MEDICINE

## 2023-01-02 PROCEDURE — 0202U NFCT DS 22 TRGT SARS-COV-2: CPT | Performed by: EMERGENCY MEDICINE

## 2023-01-02 PROCEDURE — 82803 BLOOD GASES ANY COMBINATION: CPT

## 2023-01-02 PROCEDURE — 80053 COMPREHEN METABOLIC PANEL: CPT | Performed by: EMERGENCY MEDICINE

## 2023-01-02 RX ORDER — SODIUM CHLORIDE 9 MG/ML
75 INJECTION, SOLUTION INTRAVENOUS CONTINUOUS
Status: DISCONTINUED | OUTPATIENT
Start: 2023-01-02 | End: 2023-01-05 | Stop reason: HOSPADM

## 2023-01-02 RX ORDER — DEXAMETHASONE SODIUM PHOSPHATE 4 MG/ML
6 INJECTION, SOLUTION INTRA-ARTICULAR; INTRALESIONAL; INTRAMUSCULAR; INTRAVENOUS; SOFT TISSUE DAILY
Status: DISCONTINUED | OUTPATIENT
Start: 2023-01-03 | End: 2023-01-04

## 2023-01-02 RX ORDER — SODIUM CHLORIDE 0.9 % (FLUSH) 0.9 %
10 SYRINGE (ML) INJECTION AS NEEDED
Status: DISCONTINUED | OUTPATIENT
Start: 2023-01-02 | End: 2023-01-05 | Stop reason: HOSPADM

## 2023-01-02 RX ORDER — ACETAMINOPHEN 325 MG/1
650 TABLET ORAL EVERY 4 HOURS PRN
Status: DISCONTINUED | OUTPATIENT
Start: 2023-01-02 | End: 2023-01-05 | Stop reason: HOSPADM

## 2023-01-02 RX ORDER — IPRATROPIUM BROMIDE AND ALBUTEROL SULFATE 2.5; .5 MG/3ML; MG/3ML
3 SOLUTION RESPIRATORY (INHALATION) EVERY 4 HOURS PRN
Status: DISCONTINUED | OUTPATIENT
Start: 2023-01-02 | End: 2023-01-05 | Stop reason: HOSPADM

## 2023-01-02 RX ORDER — ALBUTEROL SULFATE 90 UG/1
2 AEROSOL, METERED RESPIRATORY (INHALATION) EVERY 6 HOURS PRN
Status: DISCONTINUED | OUTPATIENT
Start: 2023-01-02 | End: 2023-01-05 | Stop reason: HOSPADM

## 2023-01-02 RX ORDER — DEXAMETHASONE SODIUM PHOSPHATE 4 MG/ML
6 INJECTION, SOLUTION INTRA-ARTICULAR; INTRALESIONAL; INTRAMUSCULAR; INTRAVENOUS; SOFT TISSUE ONCE
Status: COMPLETED | OUTPATIENT
Start: 2023-01-02 | End: 2023-01-02

## 2023-01-02 RX ORDER — BENZONATATE 100 MG/1
100 CAPSULE ORAL 3 TIMES DAILY PRN
Status: DISCONTINUED | OUTPATIENT
Start: 2023-01-02 | End: 2023-01-05 | Stop reason: HOSPADM

## 2023-01-02 RX ORDER — ACETAMINOPHEN 650 MG/1
650 SUPPOSITORY RECTAL EVERY 4 HOURS PRN
Status: DISCONTINUED | OUTPATIENT
Start: 2023-01-02 | End: 2023-01-05 | Stop reason: HOSPADM

## 2023-01-02 RX ADMIN — SODIUM CHLORIDE, POTASSIUM CHLORIDE, SODIUM LACTATE AND CALCIUM CHLORIDE 1000 ML: 600; 310; 30; 20 INJECTION, SOLUTION INTRAVENOUS at 22:31

## 2023-01-02 RX ADMIN — CEFTRIAXONE 1 G: 1 INJECTION, POWDER, FOR SOLUTION INTRAMUSCULAR; INTRAVENOUS at 22:05

## 2023-01-02 RX ADMIN — AZITHROMYCIN MONOHYDRATE 500 MG: 500 INJECTION, POWDER, LYOPHILIZED, FOR SOLUTION INTRAVENOUS at 22:31

## 2023-01-02 RX ADMIN — DEXAMETHASONE SODIUM PHOSPHATE 6 MG: 4 INJECTION, SOLUTION INTRAMUSCULAR; INTRAVENOUS at 22:04

## 2023-01-02 NOTE — Clinical Note
Level of Care: Telemetry [5]   Admitting Physician: PARI ZHANG [[id number]]   Attending Physician: PARI ZHANG [[id number]]

## 2023-01-03 PROBLEM — E78.5 HYPERLIPIDEMIA: Status: ACTIVE | Noted: 2023-01-03

## 2023-01-03 PROBLEM — D72.829 LEUKOCYTOSIS: Status: ACTIVE | Noted: 2023-01-03

## 2023-01-03 PROBLEM — U07.1 COVID-19 VIRUS DETECTED: Status: RESOLVED | Noted: 2023-01-02 | Resolved: 2023-01-03

## 2023-01-03 PROBLEM — U07.1 ACUTE HYPOXEMIC RESPIRATORY FAILURE DUE TO COVID-19 (HCC): Status: ACTIVE | Noted: 2023-01-03

## 2023-01-03 PROBLEM — J12.82 PNEUMONIA DUE TO COVID-19 VIRUS: Status: ACTIVE | Noted: 2023-01-03

## 2023-01-03 PROBLEM — J96.01 ACUTE HYPOXEMIC RESPIRATORY FAILURE DUE TO COVID-19 (HCC): Status: ACTIVE | Noted: 2023-01-03

## 2023-01-03 PROBLEM — I48.92 PAROXYSMAL ATRIAL FLUTTER: Status: ACTIVE | Noted: 2023-01-03

## 2023-01-03 PROBLEM — U07.1 PNEUMONIA DUE TO COVID-19 VIRUS: Status: ACTIVE | Noted: 2023-01-03

## 2023-01-03 LAB
ALBUMIN SERPL-MCNC: 3.5 G/DL (ref 3.5–5.2)
ALBUMIN/GLOB SERPL: 1.3 G/DL
ALP SERPL-CCNC: 74 U/L (ref 39–117)
ALT SERPL W P-5'-P-CCNC: 24 U/L (ref 1–41)
ANION GAP SERPL CALCULATED.3IONS-SCNC: 10 MMOL/L (ref 5–15)
AST SERPL-CCNC: 25 U/L (ref 1–40)
BASOPHILS # BLD AUTO: 0.1 10*3/MM3 (ref 0–0.2)
BASOPHILS NFR BLD AUTO: 0.2 % (ref 0–1.5)
BILIRUB CONJ SERPL-MCNC: <0.2 MG/DL (ref 0–0.3)
BILIRUB SERPL-MCNC: 0.6 MG/DL (ref 0–1.2)
BUN SERPL-MCNC: 19 MG/DL (ref 8–23)
BUN/CREAT SERPL: 17.1 (ref 7–25)
CALCIUM SPEC-SCNC: 8.5 MG/DL (ref 8.6–10.5)
CHLORIDE SERPL-SCNC: 104 MMOL/L (ref 98–107)
CO2 SERPL-SCNC: 24 MMOL/L (ref 22–29)
CREAT SERPL-MCNC: 1.11 MG/DL (ref 0.76–1.27)
D-LACTATE SERPL-SCNC: 1.7 MMOL/L (ref 0.5–2)
DEPRECATED RDW RBC AUTO: 41.6 FL (ref 37–54)
EGFRCR SERPLBLD CKD-EPI 2021: 70.1 ML/MIN/1.73
EOSINOPHIL # BLD AUTO: 0 10*3/MM3 (ref 0–0.4)
EOSINOPHIL NFR BLD AUTO: 0 % (ref 0.3–6.2)
ERYTHROCYTE [DISTWIDTH] IN BLOOD BY AUTOMATED COUNT: 12.4 % (ref 12.3–15.4)
GLOBULIN UR ELPH-MCNC: 2.8 GM/DL
GLUCOSE SERPL-MCNC: 161 MG/DL (ref 65–99)
HCT VFR BLD AUTO: 41.6 % (ref 37.5–51)
HGB BLD-MCNC: 13.4 G/DL (ref 13–17.7)
L PNEUMO1 AG UR QL IA: NEGATIVE
LYMPHOCYTES # BLD AUTO: 0.7 10*3/MM3 (ref 0.7–3.1)
LYMPHOCYTES NFR BLD AUTO: 2.6 % (ref 19.6–45.3)
MCH RBC QN AUTO: 31 PG (ref 26.6–33)
MCHC RBC AUTO-ENTMCNC: 32.2 G/DL (ref 31.5–35.7)
MCV RBC AUTO: 96 FL (ref 79–97)
MONOCYTES # BLD AUTO: 1 10*3/MM3 (ref 0.1–0.9)
MONOCYTES NFR BLD AUTO: 3.5 % (ref 5–12)
NEUTROPHILS NFR BLD AUTO: 26.6 10*3/MM3 (ref 1.7–7)
NEUTROPHILS NFR BLD AUTO: 93.7 % (ref 42.7–76)
NRBC BLD AUTO-RTO: 0 /100 WBC (ref 0–0.2)
PLATELET # BLD AUTO: 192 10*3/MM3 (ref 140–450)
PMV BLD AUTO: 7.1 FL (ref 6–12)
POTASSIUM SERPL-SCNC: 4.6 MMOL/L (ref 3.5–5.2)
PROT SERPL-MCNC: 6.3 G/DL (ref 6–8.5)
RBC # BLD AUTO: 4.33 10*6/MM3 (ref 4.14–5.8)
SODIUM SERPL-SCNC: 138 MMOL/L (ref 136–145)
WBC NRBC COR # BLD: 28.4 10*3/MM3 (ref 3.4–10.8)

## 2023-01-03 PROCEDURE — 25010000002 AZITHROMYCIN PER 500 MG: Performed by: NURSE PRACTITIONER

## 2023-01-03 PROCEDURE — 87449 NOS EACH ORGANISM AG IA: CPT | Performed by: NURSE PRACTITIONER

## 2023-01-03 PROCEDURE — 25010000002 DEXAMETHASONE PER 1 MG: Performed by: NURSE PRACTITIONER

## 2023-01-03 PROCEDURE — 25010000002 REMDESIVIR 100 MG/20ML SOLUTION 1 EACH VIAL: Performed by: NURSE PRACTITIONER

## 2023-01-03 PROCEDURE — 83605 ASSAY OF LACTIC ACID: CPT

## 2023-01-03 PROCEDURE — 25010000002 CEFTRIAXONE PER 250 MG: Performed by: NURSE PRACTITIONER

## 2023-01-03 PROCEDURE — 85025 COMPLETE CBC W/AUTO DIFF WBC: CPT | Performed by: NURSE PRACTITIONER

## 2023-01-03 PROCEDURE — 25010000002 REMDESIVIR 100 MG RECONSTITUTED SOLUTION: Performed by: NURSE PRACTITIONER

## 2023-01-03 PROCEDURE — 80053 COMPREHEN METABOLIC PANEL: CPT | Performed by: NURSE PRACTITIONER

## 2023-01-03 PROCEDURE — 82248 BILIRUBIN DIRECT: CPT | Performed by: NURSE PRACTITIONER

## 2023-01-03 RX ORDER — METOPROLOL TARTRATE 50 MG/1
50 TABLET, FILM COATED ORAL 2 TIMES DAILY
COMMUNITY

## 2023-01-03 RX ORDER — ATORVASTATIN CALCIUM 10 MG/1
10 TABLET, FILM COATED ORAL NIGHTLY
Status: DISCONTINUED | OUTPATIENT
Start: 2023-01-03 | End: 2023-01-05 | Stop reason: HOSPADM

## 2023-01-03 RX ORDER — CHLORTHALIDONE 25 MG/1
750 TABLET ORAL 2 TIMES DAILY
Status: DISCONTINUED | OUTPATIENT
Start: 2023-01-03 | End: 2023-01-05 | Stop reason: HOSPADM

## 2023-01-03 RX ORDER — LOSARTAN POTASSIUM 50 MG/1
50 TABLET ORAL DAILY
Status: DISCONTINUED | OUTPATIENT
Start: 2023-01-03 | End: 2023-01-05 | Stop reason: HOSPADM

## 2023-01-03 RX ORDER — METOPROLOL TARTRATE 50 MG/1
50 TABLET, FILM COATED ORAL 2 TIMES DAILY
Status: DISCONTINUED | OUTPATIENT
Start: 2023-01-03 | End: 2023-01-05 | Stop reason: HOSPADM

## 2023-01-03 RX ADMIN — SODIUM CHLORIDE 75 ML/HR: 9 INJECTION, SOLUTION INTRAVENOUS at 00:16

## 2023-01-03 RX ADMIN — REMDESIVIR 100 MG: 100 INJECTION, POWDER, LYOPHILIZED, FOR SOLUTION INTRAVENOUS at 23:18

## 2023-01-03 RX ADMIN — Medication 750 MG: at 21:53

## 2023-01-03 RX ADMIN — DEXAMETHASONE SODIUM PHOSPHATE 6 MG: 4 INJECTION, SOLUTION INTRAMUSCULAR; INTRAVENOUS at 11:03

## 2023-01-03 RX ADMIN — CEFTRIAXONE 1 G: 1 INJECTION, POWDER, FOR SOLUTION INTRAMUSCULAR; INTRAVENOUS at 20:17

## 2023-01-03 RX ADMIN — RIVAROXABAN 20 MG: 20 TABLET, FILM COATED ORAL at 20:16

## 2023-01-03 RX ADMIN — LOSARTAN POTASSIUM 50 MG: 50 TABLET, FILM COATED ORAL at 20:16

## 2023-01-03 RX ADMIN — METOPROLOL TARTRATE 50 MG: 50 TABLET, FILM COATED ORAL at 20:16

## 2023-01-03 RX ADMIN — AZITHROMYCIN 500 MG: 500 INJECTION, POWDER, LYOPHILIZED, FOR SOLUTION INTRAVENOUS at 21:52

## 2023-01-03 RX ADMIN — REMDESIVIR 200 MG: 100 INJECTION, POWDER, LYOPHILIZED, FOR SOLUTION INTRAVENOUS at 03:13

## 2023-01-03 RX ADMIN — Medication 10 ML: at 20:16

## 2023-01-03 RX ADMIN — ATORVASTATIN CALCIUM 10 MG: 10 TABLET, FILM COATED ORAL at 20:16

## 2023-01-03 NOTE — H&P
[facility] Medicine Services      Patient Name: Leroy Mccrary  : 1950  MRN: [id number]  Primary Care Physician:  Sydnie Rodriguez APRN  Date of admission: 2023      Subjective      Chief Complaint: flu like symptoms     History of Present Illness: Leroy Mccrary is a 72 y.o. male who presented to [facility] on 2023 complaining of 1 week of cough congestion fever body aches and fatigue.  He reports some chills and some dyspnea.  EMS was reported to have his sats in the 82%.  He is currently stable in the upper 90s on a nonrebreather.  He denies any dizziness, chest pain nausea vomiting or diarrhea.  He reports he took a home COVID test which was negative.  He was COVID-19 positive here with a WBC of 14.10 lactate 2.2 glucose 129.  ABG showed a pH of 7.468 PCO2 31.7 PO2 60.8 troponin 0.016 proBNP 614.6.  Chest x-ray showed per radiology showed patchy bibasilar airspace disease which may relate to atelectasis and/or pneumonia and a small left pleural effusion.  EKG shows a paced rhythm.  He does have a cardiac pacemaker in situ with a past medical history of atrial flutter he is on chronic anticoagulation.  He reports he has had 3 COVID vaccines.  He was given albuterol in ED, DuoNeb treatment, IV dexamethasone 6 mg and pharmacy has been consulted for remdesivir.  Given elevated WBC he was started on IV azithromycin and IV ceftriaxone with blood cultures pending.  He will be admitted for further evaluation and treatment.    Review of Systems   Constitutional: Positive for chills and malaise/fatigue.   HENT: Positive for congestion.    Eyes: Negative.    Cardiovascular: Negative.    Respiratory: Positive for cough, shortness of breath and wheezing.    Endocrine: Negative.    Hematologic/Lymphatic: Negative.    Skin: Negative.    Musculoskeletal: Positive for myalgias.   Gastrointestinal: Negative.    Genitourinary: Negative.    Neurological: Negative.     Psychiatric/Behavioral: Negative.    Allergic/Immunologic: Negative.    All other systems reviewed and are negative.      Personal History     Past Medical History:   Diagnosis Date   • Acute respiratory disease due to COVID-19 virus 01/02/2023   • Atrial flutter (HCC)    • COVID-19 virus detected 01/02/2023   • Elevated cholesterol    • H/O atrial flutter    • Hyperlipidemia    • Hypertension    • Sleep apnea     mild, does not need CPAP       Past Surgical History:   Procedure Laterality Date   • APPENDECTOMY     • CARDIAC CATHETERIZATION      none in the last 3 years ago   • COLONOSCOPY N/A 5/5/2021    Procedure: COLONOSCOPY with 1 hot and 2 cold polyps with clip placement in sigmoid;  Surgeon: Mayco Bui MD;  Location: [facility] ENDOSCOPY;  Service: Gastroenterology;  Laterality: N/A;  colon polyps, internal hemorrhoids   • 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 Otherwise pertinent FHx was reviewed and not pertinent to current issue.    Social History:  reports that he quit smoking about 26 years ago. He has never used smokeless tobacco. He reports that he does not currently use alcohol. He reports that he does not use drugs.    Home Medications:  Prior to Admission Medications     Prescriptions Last Dose Informant Patient Reported? Taking?    atorvastatin (LIPITOR) 10 MG tablet   Yes No    Take 10 mg by mouth Daily.    dilTIAZem CD (Cardizem CD) 120 MG 24 hr capsule   No No    Take 1 capsule by mouth Daily.    losartan (COZAAR) 50 MG tablet   Yes No    Take 50 mg by mouth Daily.    magnesium oxide (MAG-OX) 400 MG tablet   Yes No    Take 400 mg by mouth Daily.    niacin ER (Slo-Niacin) 750 MG tablet controlled-release tablet   Yes No    Take 750 mg by mouth 2 (two) times a day.    rivaroxaban (XARELTO) 20 MG tablet   Yes No    Take 20 mg by mouth  "Daily. HOLD 48 per gsi            Allergies:  Allergies   Allergen Reactions   • Bextra [Valdecoxib] Unknown - Low Severity       Objective      Vitals:   Temp:  [98.6 °F (37 °C)-99.3 °F (37.4 °C)] 98.6 °F (37 °C)  Heart Rate:  [72-95] 72  Resp:  [17-20] 20  BP: ()/(45-76) 137/66  Flow (L/min):  [5-7] 7    Physical Exam  Vitals reviewed.   Constitutional:       Appearance: Normal appearance. He is obese.   HENT:      Head: Normocephalic and atraumatic.      Right Ear: External ear normal.      Left Ear: External ear normal.      Nose: Nose normal.      Mouth/Throat:      Mouth: Mucous membranes are moist.   Eyes:      Extraocular Movements: Extraocular movements intact.   Cardiovascular:      Rate and Rhythm: Normal rate and regular rhythm.      Pulses: Normal pulses.      Heart sounds: Normal heart sounds.   Pulmonary:      Breath sounds: Wheezing present.   Abdominal:      Palpations: Abdomen is soft.   Genitourinary:     Comments: deferred  Musculoskeletal:      Cervical back: Normal range of motion and neck supple.   Skin:     General: Skin is warm and dry.   Neurological:      General: No focal deficit present.      Mental Status: He is alert and oriented to person, place, and time.   Psychiatric:         Mood and Affect: Mood normal.         Behavior: Behavior normal.         Thought Content: Thought content normal.         Judgment: Judgment normal.         Result Review    Result Review:  I have personally reviewed the results from the time of this admission to 1/3/2023 06:14 EST and agree with these findings:  [x]  Laboratory  [x]  Microbiology  [x]  Radiology  [x]  EKG/Telemetry   []  Cardiology/Vascular   []  Pathology  [x]  Old records  []  Other:  Most notable findings include: COVID-19 positive, WBC 14.10, lactate 2.2, glucose 129, ABG showing pH 7.468 PCO2 31.7 PO2 60.8 troponin 0.016 proBNP 614.6    Chest x-ray per radiology:    \"IMPRESSION:  1. Patchy bibasilar airspace disease which may " "relate to atelectasis  and/or pneumonia.  2. Suspect small left pleural effusion.  \"          Assessment & Plan        Active Hospital Problems:  Active Hospital Problems    Diagnosis    • **Acute respiratory distress    • Acute respiratory disease due to COVID-19 virus    • Paroxysmal atrial flutter (HCC)    • Hyperlipidemia    • Leukocytosis    • COVID-19 virus detected    • Cardiac pacemaker in situ      Plan:    Acute respiratory distress secondary to COVID-19/possible underlying bacterial pneumonia, now stable on nonrebreather, DuoNebs every 4 hours as needed, albuterol every 4 hours as needed, see plan below     COVID-19 virus detected,, 6 mg IV dexamethasone daily, pharmacy consulted for remdesivir, see plan above    Leukocytosis, WBC 14.10, continue IV azithromycin and IV ceftriaxone until blood cultures resulted for underlying possible bacterial pneumonia    Paroxysmal atrial flutter/EKG shows ventricularly paced rhythm, cardiac pacemaker in situ, was on home diltiazem and Xarelto reorder pending verification    Hyperlipidemia, was on home statin reorder pending verification    Hypertension, was on home losartan reorder pending verification monitor BP Will add IV anti-hypertensives if needed      DVT prophylaxis:  No DVT prophylaxis order currently exists.    CODE STATUS:    Code Status (Patient has no pulse and is not breathing): CPR (Attempt to Resuscitate)  Medical Interventions (Patient has pulse or is breathing): Full Support    Admission Status:  I believe this patient meets inpatient  status.    I discussed the patient's findings and my recommendations with patient.    This patient has been examined wearing appropriate Personal Protective Equipment . 01/03/23      Signature: Electronically signed by LIZZY Delgadillo, 01/03/23, 6:14 AM EST.  "

## 2023-01-03 NOTE — ED NOTES
Patient complains of cough/congestion for a week, denies fever but reported shaking chills earlier this afternoon which prompted visit. His oxygen levels have been low - wife reports 82% but patient states it was 90-97%.

## 2023-01-03 NOTE — PLAN OF CARE
Problem: Adult Inpatient Plan of Care  Goal: Plan of Care Review  Flowsheets (Taken 1/3/2023 1548)  Progress: no change  Plan of Care Reviewed With:   patient   spouse  Goal: Absence of Hospital-Acquired Illness or Injury  Intervention: Identify and Manage Fall Risk  Recent Flowsheet Documentation  Taken 1/3/2023 1400 by Erica Servin RN  Safety Promotion/Fall Prevention:   activity supervised   assistive device/personal items within reach   clutter free environment maintained   safety round/check completed  Taken 1/3/2023 1200 by Erica Servin RN  Safety Promotion/Fall Prevention:   activity supervised   assistive device/personal items within reach   clutter free environment maintained   safety round/check completed  Taken 1/3/2023 1000 by Erica Servin RN  Safety Promotion/Fall Prevention:   activity supervised   assistive device/personal items within reach   clutter free environment maintained   safety round/check completed  Taken 1/3/2023 0800 by Erica Servin RN  Safety Promotion/Fall Prevention:   activity supervised   assistive device/personal items within reach   clutter free environment maintained   safety round/check completed  Taken 1/3/2023 0730 by Erica Servin RN  Safety Promotion/Fall Prevention:   activity supervised   assistive device/personal items within reach   clutter free environment maintained   safety round/check completed  Intervention: Prevent Skin Injury  Recent Flowsheet Documentation  Taken 1/3/2023 1400 by Erica Servin RN  Body Position: position changed independently  Taken 1/3/2023 1200 by Erica Servin RN  Body Position: position changed independently  Taken 1/3/2023 1000 by Erica Servin RN  Body Position: position changed independently  Taken 1/3/2023 0800 by Erica Servin RN  Body Position: position changed independently  Taken 1/3/2023 0730 by Erica Servin RN  Body Position: position changed independently  Intervention: Prevent and Manage VTE (Venous  Thromboembolism) Risk  Recent Flowsheet Documentation  Taken 1/3/2023 1400 by Erica Servin RN  Activity Management: activity adjusted per tolerance  Taken 1/3/2023 1200 by Erica Servin RN  Activity Management: activity adjusted per tolerance  Taken 1/3/2023 1000 by Erica Servin RN  Activity Management: activity adjusted per tolerance  Taken 1/3/2023 0800 by Erica Servin RN  Activity Management: activity adjusted per tolerance  Taken 1/3/2023 0730 by Erica Servin RN  Activity Management: activity adjusted per tolerance  Intervention: Prevent Infection  Recent Flowsheet Documentation  Taken 1/3/2023 1400 by Erica Servin RN  Infection Prevention: hand hygiene promoted  Taken 1/3/2023 1200 by Erica Servin RN  Infection Prevention: hand hygiene promoted  Taken 1/3/2023 1000 by Erica Servin RN  Infection Prevention: hand hygiene promoted  Taken 1/3/2023 0800 by Erica Servin RN  Infection Prevention: hand hygiene promoted  Taken 1/3/2023 0730 by Erica Servin RN  Infection Prevention: hand hygiene promoted  Goal: Optimal Comfort and Wellbeing  Intervention: Provide Person-Centered Care  Recent Flowsheet Documentation  Taken 1/3/2023 0730 by Erica Servin RN  Trust Relationship/Rapport:   care explained   choices provided   emotional support provided   empathic listening provided   questions answered   questions encouraged   thoughts/feelings acknowledged   reassurance provided  Goal: Readiness for Transition of Care  Intervention: Mutually Develop Transition Plan  Recent Flowsheet Documentation  Taken 1/3/2023 1546 by Erica Servin RN  Equipment Currently Used at Home: none  Transportation Concerns: none     Problem: Breathing Pattern Ineffective  Goal: Effective Breathing Pattern  Intervention: Promote Improved Breathing Pattern  Recent Flowsheet Documentation  Taken 1/3/2023 1400 by Erica Servin RN  Head of Bed (HOB) Positioning:   HOB elevated   HOB at 30-45 degrees  Taken 1/3/2023  1200 by Erica Servin RN  Head of Bed (HOB) Positioning:   HOB elevated   HOB at 30-45 degrees  Taken 1/3/2023 1000 by Erica Servin RN  Head of Bed (HOB) Positioning:   HOB elevated   HOB at 30-45 degrees  Taken 1/3/2023 0800 by Erica Servin RN  Head of Bed (HOB) Positioning:   HOB elevated   HOB at 30-45 degrees  Taken 1/3/2023 0730 by Erica Servin RN  Head of Bed (HOB) Positioning:   HOB elevated   HOB at 30-45 degrees   Goal Outcome Evaluation:  Plan of Care Reviewed With: patient, spouse        Progress: no change     Patient being admitted for respiratory distress and pneumonia due to covid

## 2023-01-03 NOTE — CASE MANAGEMENT/SOCIAL WORK
Discharge Planning Assessment   [facility]     Patient Name: Leroy Mccrary  MRN: [id number]  Today's Date: 1/3/2023    Admit Date: 1/2/2023    Plan: Covid +, From Home with Wife, Watch for Oxygen needs, 8L HF   Discharge Needs Assessment     Row Name 01/03/23 1217       Living Environment    People in Home spouse    Current Living Arrangements home    Primary Care Provided by self    Provides Primary Care For no one    Able to Return to Prior Arrangements yes       Resource/Environmental Concerns    Resource/Environmental Concerns none    Transportation Concerns none       Transition Planning    Patient/Family Anticipates Transition to home    Patient/Family Anticipated Services at Transition none    Transportation Anticipated family or friend will provide       Discharge Needs Assessment    Equipment Currently Used at Home bp cuff    Concerns to be Addressed denies needs/concerns at this time    Equipment Needed After Discharge oxygen               Discharge Plan     Row Name 01/03/23 1218       Plan    Plan Covid +, From Home with Wife, Watch for Oxygen needs, 8L HF    Patient/Family in Agreement with Plan yes    Plan Comments Met with Patient and wife at bedside using covid precautions. Lifes at home with wife. IADL's, wife can provide transportation. PCP and Pharmacy verified, able to afford medications. Currently requiring oxygen in hospital and if oxygen needed at discharge is agreeable to [facility]. D/C BArriers: IV Steroids, 8L HF, IV ATB              Continued Care and Services - Admitted Since 1/2/2023    Coordination has not been started for this encounter.       Expected Discharge Date and Time     Expected Discharge Date Expected Discharge Time    Jan 6, 2023          Demographic Summary     Row Name 01/03/23 1217       General Information    Admission Type inpatient    Arrived From emergency department    Required Notices Provided Important Message from Medicare    Referral Source admission list     Preferred Language English               Functional Status     Row Name 01/03/23 1217       Functional Status    Usual Activity Tolerance good    Current Activity Tolerance good       Functional Status, IADL    Medications independent    Meal Preparation independent    Housekeeping independent    Laundry independent    Shopping independent       Mental Status    General Appearance WDL WDL       Mental Status Summary    Recent Changes in Mental Status/Cognitive Functioning no changes                      Patient Forms     Row Name 01/03/23 1220       Patient Forms    Important Message from Medicare (IMM) --  IMM 1/2 per reg              Met with patient in room wearing PPE: N 95 mask, face shield/goggles, gloves, gown.      Maintained distance greater than six feet and spent less than 15 minutes in the room.          Ruth Ann Daily RN

## 2023-01-03 NOTE — PROGRESS NOTES
[facility] Medicine Services   Daily Progress Note    Patient Name: Leroy Mccrary  : 1950  MRN: [id number]  Primary Care Physician:  Sydnie Rodriguez APRN  Date of admission: 2023      Subjective      Chief Complaint: shortness of breath/malaise    Admitted for COVID-19 and acute hypoxemic respiratory failure  Feeling much better since admission.  Down to 3 L nasal cannula.  Continues treatment on antibiotics and antivirals along with steroids and aggressive pulmonary toilet.  He feels like his wheezing is much better.  His wife is at the bedside during our visit.  Bedside rounding completed with the nursing staff. No other acute concerns.    Objective      Vitals:   Temp:  [98.4 °F (36.9 °C)-99.3 °F (37.4 °C)] 98.4 °F (36.9 °C)  Heart Rate:  [72-95] 76  Resp:  [17-20] 20  BP: ()/(45-76) 130/61  Flow (L/min):  [5-8] 8    Physical Exam     GEN: WDWN, no acute distress  HEENT: NCAT, PERRLA, moist mucous membranes  NECK: Supple, midline trachea  CARD: RRR, no appreciable M/R/G, no peripheral edema  PULM: Coarse bilateral breath sounds, some mild expiratory wheezing, diminished at the bases, non-distressed  ABD: soft, NTND, normoactive bowel sounds throughout  NEURO: Grossly intact, non-focal exam  PSYCH: Pleasant     Result Review    Result Review:  I have personally reviewed the results from the time of this admission to 1/3/2023 14:18 EST and agree with these findings:  [x]  Laboratory  [x]  Microbiology  [x]  Radiology  [x]  EKG/Telemetry   [x]  Cardiology/Vascular   []  Pathology  []  Old records  []  Other:    Assessment & Plan        atorvastatin, 10 mg, Oral, Nightly  azithromycin, 500 mg, Intravenous, Q24H  cefTRIAXone, 1 g, Intravenous, Q24H  dexamethasone, 6 mg, Intravenous, Daily  losartan, 50 mg, Oral, Daily  [START ON 2023] magnesium oxide, 400 mg, Oral, Daily  metoprolol tartrate, 50 mg, Oral, BID  remdesivir, 100 mg, Intravenous, Q24H  rivaroxaban, 20 mg, Oral,  Daily With Dinner       Pharmacy Consult - Remdesivir,   sodium chloride, 75 mL/hr, Last Rate: 75 mL/hr (01/03/23 0016)         Active Hospital Problems:  Active Hospital Problems    Diagnosis    • **Acute hypoxemic respiratory failure due to COVID-19 (HCC)    • Paroxysmal atrial flutter (HCC)    • Hyperlipidemia    • Leukocytosis    • Pneumonia due to COVID-19 virus    • Acute respiratory disease due to COVID-19 virus    • Cardiac pacemaker in situ      Plan:   Continue antibiotics, antiviral therapy, and aggressive pulmonary toilet.  Continue steroids.  Wean supplemental oxygen as tolerated.  He has already made great improvement in this regard.  Continue home medications for his chronic medical conditions as clinically appropriate.    DVT prophylaxis:  On Xarelto    CODE STATUS:    Code Status (Patient has no pulse and is not breathing): CPR (Attempt to Resuscitate)  Medical Interventions (Patient has pulse or is breathing): Full Support    Disposition:  Hopeful to discharge home over the next couple of days.    This patient has been examined wearing appropriate Personal Protective Equipment. 01/03/23      Electronically signed by Abhijit Scott MD, 01/03/23, 14:18 EST.  [facility] Hospitalist Team

## 2023-01-03 NOTE — ED PROVIDER NOTES
Subjective   History of Present Illness  Chief complaint cough congestion short of breath chills    History of present 72-year-old male 1 week history of cough congestion body aches and general fatigue.  He states that today he got some chills.  His oxygen saturation at home was about 90% but he got worse and got severe chills and rigors he went to an urgent care was reported to have sats of 82% EMS was called and he was transferred to the hospital.  Patient here was on nonrebreather satting in the upper 90s.  He denies any chest pain neck arm jaw pain.  He states he took a home flu and COVID test a few days ago which was unremarkable.  No vomiting or diarrhea no ill exposures.  No leg pain or swelling no recent long car ride plane or immobilizations.  Denies any injury.  Patient does have a history of atrial flutter and has a pacemaker and is on chronic anticoagulation.        Review of Systems   Constitutional: Positive for chills and fatigue. Negative for fever.   HENT: Positive for congestion. Negative for sinus pressure.    Respiratory: Positive for cough and shortness of breath. Negative for chest tightness.    Cardiovascular: Negative for chest pain and palpitations.   Gastrointestinal: Negative for abdominal pain and vomiting.   Genitourinary: Negative for difficulty urinating and dysuria.   Musculoskeletal: Negative for back pain and neck pain.   Skin: Negative for rash and wound.   Neurological: Positive for light-headedness. Negative for dizziness.       Past Medical History:   Diagnosis Date   • Atrial flutter (HCC)    • Elevated cholesterol    • H/O atrial flutter    • Hyperlipidemia    • Hypertension    • Sleep apnea     mild, does not need CPAP       Allergies   Allergen Reactions   • Bextra [Valdecoxib] Unknown - Low Severity       Past Surgical History:   Procedure Laterality Date   • APPENDECTOMY     • CARDIAC CATHETERIZATION      none in the last 3 years ago   • COLONOSCOPY N/A 5/5/2021     Procedure: COLONOSCOPY with 1 hot and 2 cold polyps with clip placement in sigmoid;  Surgeon: Mayco Bui MD;  Location: [facility] ENDOSCOPY;  Service: Gastroenterology;  Laterality: N/A;  colon polyps, internal hemorrhoids   • 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 No pertinent family history.    Social History     Socioeconomic History   • Marital status:    Tobacco Use   • Smoking status: Former     Types: Cigarettes     Quit date:      Years since quittin.0   • Smokeless tobacco: Never   Vaping Use   • Vaping Use: Never used   Substance and Sexual Activity   • Alcohol use: Not Currently     Comment: quit drinking in     • Drug use: Never   • Sexual activity: Defer     Prior to Admission medications    Medication Sig Start Date End Date Taking? Authorizing Provider   atorvastatin (LIPITOR) 10 MG tablet Take 10 mg by mouth Daily.    Afai Choe MD   dilTIAZem CD (Cardizem CD) 120 MG 24 hr capsule Take 1 capsule by mouth Daily. 10/19/21   Gabby Simeon APRN   losartan (COZAAR) 50 MG tablet Take 50 mg by mouth Daily.    Afia Choe MD   magnesium oxide (MAG-OX) 400 MG tablet Take 400 mg by mouth Daily.    Afia Choe MD   niacin ER (Slo-Niacin) 750 MG tablet controlled-release tablet Take 750 mg by mouth 2 (two) times a day.    Afia Choe MD   rivaroxaban (XARELTO) 20 MG tablet Take 20 mg by mouth Daily. HOLD 48 per gsi    Afia Choe MD           Objective   Physical Exam  Constitutional is a 72-year-old male awake alert moderate distress initial sats 90% on 6 L but subsequently dropped into the 86% range was placed on nonrebreather.  Improved him up to the mid 90s HEENT extraocular muscles are intact pupils equal and reactive sclera clear mouth clear.  Neck supple no adenopathy no JVD no bruits or meningeal  signs.  Lungs rhonchi and scattered wheezes no retractions.  Heart regular without murmur.  Abdomen soft without tenderness good bowel sounds no peritoneal findings or pulsatile masses.  Extremities pulses equal throughout upper and lower extremities no edema cords or Homans' sign or evidence of DVT.  Skin warm and dry without rashes or cellulitic change.  Neurologic awake alert orientated x3 no facial asymmetry normal speech without focal weak  Procedures           ED Course      Results for orders placed or performed during the hospital encounter of 01/02/23   Respiratory Panel PCR w/COVID-19(SARS-CoV-2) BENEDICTO/PRIYANKA/MARGARET/PAD/COR/MAD/BLAKE In-House, NP Swab in UTM/VTM, 3-4 HR TAT - Swab, Nasopharynx    Specimen: Nasopharynx; Swab   Result Value Ref Range    ADENOVIRUS, PCR Not Detected Not Detected    Coronavirus 229E Not Detected Not Detected    Coronavirus HKU1 Not Detected Not Detected    Coronavirus NL63 Not Detected Not Detected    Coronavirus OC43 Not Detected Not Detected    COVID19 Detected (C) Not Detected - Ref. Range    Human Metapneumovirus Not Detected Not Detected    Human Rhinovirus/Enterovirus Not Detected Not Detected    Influenza A PCR Not Detected Not Detected    Influenza B PCR Not Detected Not Detected    Parainfluenza Virus 1 Not Detected Not Detected    Parainfluenza Virus 2 Not Detected Not Detected    Parainfluenza Virus 3 Not Detected Not Detected    Parainfluenza Virus 4 Not Detected Not Detected    RSV, PCR Not Detected Not Detected    Bordetella pertussis pcr Not Detected Not Detected    Bordetella parapertussis PCR Not Detected Not Detected    Chlamydophila pneumoniae PCR Not Detected Not Detected    Mycoplasma pneumo by PCR Not Detected Not Detected   Comprehensive Metabolic Panel    Specimen: Blood   Result Value Ref Range    Glucose 129 (H) 65 - 99 mg/dL    BUN 19 8 - 23 mg/dL    Creatinine 1.17 0.76 - 1.27 mg/dL    Sodium 140 136 - 145 mmol/L    Potassium 3.9 3.5 - 5.2 mmol/L    Chloride  104 98 - 107 mmol/L    CO2 23.0 22.0 - 29.0 mmol/L    Calcium 9.1 8.6 - 10.5 mg/dL    Total Protein 6.6 6.0 - 8.5 g/dL    Albumin 4.0 3.5 - 5.2 g/dL    ALT (SGPT) 20 1 - 41 U/L    AST (SGOT) 20 1 - 40 U/L    Alkaline Phosphatase 79 39 - 117 U/L    Total Bilirubin 0.9 0.0 - 1.2 mg/dL    Globulin 2.6 gm/dL    A/G Ratio 1.5 g/dL    BUN/Creatinine Ratio 16.2 7.0 - 25.0    Anion Gap 13.0 5.0 - 15.0 mmol/L    eGFR 65.8 >60.0 mL/min/1.73   BNP    Specimen: Blood   Result Value Ref Range    proBNP 614.6 0.0 - 900.0 pg/mL   Troponin    Specimen: Blood   Result Value Ref Range    Troponin T 0.016 0.000 - 0.030 ng/mL   CBC Auto Differential    Specimen: Blood   Result Value Ref Range    WBC 14.10 (H) 3.40 - 10.80 10*3/mm3    RBC 4.68 4.14 - 5.80 10*6/mm3    Hemoglobin 14.7 13.0 - 17.7 g/dL    Hematocrit 43.7 37.5 - 51.0 %    MCV 93.3 79.0 - 97.0 fL    MCH 31.5 26.6 - 33.0 pg    MCHC 33.7 31.5 - 35.7 g/dL    RDW 12.3 12.3 - 15.4 %    RDW-SD 42.4 37.0 - 54.0 fl    MPV 6.9 6.0 - 12.0 fL    Platelets 213 140 - 450 10*3/mm3    Neutrophil % 92.4 (H) 42.7 - 76.0 %    Lymphocyte % 5.6 (L) 19.6 - 45.3 %    Monocyte % 1.4 (L) 5.0 - 12.0 %    Eosinophil % 0.3 0.3 - 6.2 %    Basophil % 0.3 0.0 - 1.5 %    Neutrophils, Absolute 13.00 (H) 1.70 - 7.00 10*3/mm3    Lymphocytes, Absolute 0.80 0.70 - 3.10 10*3/mm3    Monocytes, Absolute 0.20 0.10 - 0.90 10*3/mm3    Eosinophils, Absolute 0.00 0.00 - 0.40 10*3/mm3    Basophils, Absolute 0.00 0.00 - 0.20 10*3/mm3    nRBC 0.1 0.0 - 0.2 /100 WBC   Blood Gas, Arterial -    Specimen: Arterial Blood   Result Value Ref Range    Site Right Radial     Wesley's Test Positive     pH, Arterial 7.468 (H) 7.350 - 7.450 pH units    pCO2, Arterial 31.7 (L) 35.0 - 48.0 mm Hg    pO2, Arterial 60.8 (L) 83.0 - 108.0 mm Hg    HCO3, Arterial 23.0 21.0 - 28.0 mmol/L    Base Excess, Arterial 0.2 0.0 - 3.0 mmol/L    O2 Saturation, Arterial 92.7 (L) 94.0 - 98.0 %    CO2 Content 23.9 22 - 29 mmol/L    Barometric Pressure for  Blood Gas      Modality Cannula     FIO2 40 %    Hemodilution No    POC Lactate    Specimen: Blood   Result Value Ref Range    Lactate 2.2 (C) 0.5 - 2.0 mmol/L   ECG 12 Lead Dyspnea   Result Value Ref Range    QT Interval 388 ms     XR Chest 1 View    Result Date: 1/2/2023  1. Patchy bibasilar airspace disease which may relate to atelectasis and/or pneumonia. 2. Suspect small left pleural effusion.  Electronically Signed By-Abe Escalera MD On:1/2/2023 8:05 PM This report was finalized on [id number] by  Abe Escalera MD.    Medications   sodium chloride 0.9 % flush 10 mL (has no administration in time range)   cefTRIAXone (ROCEPHIN) 1 g in sodium chloride 0.9 % 100 mL IVPB (has no administration in time range)   azithromycin (ZITHROMAX) 500 mg in sodium chloride 0.9 % 250 mL IVPB-VTB (has no administration in time range)   dexamethasone (DECADRON) injection 6 mg (has no administration in time range)   lactated ringers bolus 1,000 mL (has no administration in time range)          EKG my interpretation ventricular paced rhythm at 90 QTC of 487 really no change from previous EKG abnormal                                  Medical Decision Making  Medical decision making.  Patient IV established placed on the monitor which revealed a paced rhythm.  Transfer between EMS stretcher and the bed the patient had a brief syncopal episode did not fall or hit the ground and came back around relatively quickly.  Sats about 86% he was placed on nonrebreather.  Patient has very significant illness potentially life-threatening.  The patient triggered sepsis protocol and he had labs obtained and EKG obtained.  EKG obtained reviewed by me showed a paced rhythm but unchanged from previous.  Labs obtained reviewed by me COVID-19 was positive chemistries unremarkable proBNP was 614 troponin was negative.  White count was 14.1 lactate 2.2 blood gas obtained pH 7.46 PCO2 31 PO2 of 60.  Patient was subsequently switched to high flow nasal  cannula gotten off the nonrebreather.  All labs obtained reviewed by me independently chest x-ray obtained on my review shows some right lower lobe pneumonia.  The patient had blood cultures which are pending started on 1 L lactated Ringer's bolus.  COVID-19 is positive but he has a isolated right lower lobe pneumonia we will treat as secondary bacterial.  He was started on Rocephin and Zithromax.  He is given Decadron 6 mg IV.  The patient repeat examination at 9:30 PM was resting comfortably sats in the upper 90s on high flow nasal cannula.  Respiratory rate now about 22 heart rate in 80s.  He is feeling much better he reports.  He is awake and alert and follows commands without focal deficits.  Is been no hypotension.  I do not see evidence of acute cardiac ischemia DVT no leg pain or swelling.  No evidence of pulmonary embolism on my clinical exam as the patient is already anticoagulated on Xarelto.  No evidence that she has acute CHF or pericarditis or myocarditis.  These are all based on my clinical evaluation exam I suspect this is related to pneumonia and COVID-19.  I discussion with the patient.  Family also notified.  And he will be admitted to the hospital for further care.    Acute respiratory distress: complicated acute illness or injury  COVID-19: complicated acute illness or injury  Hypoxia: complicated acute illness or injury  Pneumonia of right lower lobe due to infectious organism: complicated acute illness or injury  Amount and/or Complexity of Data Reviewed  Labs: ordered. Decision-making details documented in ED Course.  Radiology: ordered. Decision-making details documented in ED Course.  ECG/medicine tests: ordered. Decision-making details documented in ED Course.      Risk  Decision regarding hospitalization.          Final diagnoses:   Acute respiratory distress   Hypoxia   Pneumonia of right lower lobe due to infectious organism   COVID-19       ED Disposition  ED Disposition     ED  Disposition   Decision to Admit    Condition   --    Comment   Level of Care: Telemetry [5]   Admitting Physician: PARI ZHANG [[id number]]   Attending Physician: PARI ZHANG [[id number]]               No follow-up provider specified.       Medication List      No changes were made to your prescriptions during this visit.          Karlos Barnes MD  01/02/23 2516

## 2023-01-04 LAB
ALBUMIN SERPL-MCNC: 3.8 G/DL (ref 3.5–5.2)
ALBUMIN/GLOB SERPL: 1.3 G/DL
ALP SERPL-CCNC: 98 U/L (ref 39–117)
ALT SERPL W P-5'-P-CCNC: 19 U/L (ref 1–41)
ANION GAP SERPL CALCULATED.3IONS-SCNC: 11 MMOL/L (ref 5–15)
AST SERPL-CCNC: 17 U/L (ref 1–40)
BASOPHILS # BLD AUTO: 0.1 10*3/MM3 (ref 0–0.2)
BASOPHILS NFR BLD AUTO: 0.3 % (ref 0–1.5)
BILIRUB CONJ SERPL-MCNC: <0.2 MG/DL (ref 0–0.3)
BILIRUB SERPL-MCNC: 0.5 MG/DL (ref 0–1.2)
BUN SERPL-MCNC: 20 MG/DL (ref 8–23)
BUN/CREAT SERPL: 23 (ref 7–25)
CALCIUM SPEC-SCNC: 9.1 MG/DL (ref 8.6–10.5)
CHLORIDE SERPL-SCNC: 105 MMOL/L (ref 98–107)
CO2 SERPL-SCNC: 24 MMOL/L (ref 22–29)
CREAT SERPL-MCNC: 0.87 MG/DL (ref 0.76–1.27)
DEPRECATED RDW RBC AUTO: 45.9 FL (ref 37–54)
EGFRCR SERPLBLD CKD-EPI 2021: 91.1 ML/MIN/1.73
EOSINOPHIL # BLD AUTO: 0.3 10*3/MM3 (ref 0–0.4)
EOSINOPHIL NFR BLD AUTO: 1 % (ref 0.3–6.2)
ERYTHROCYTE [DISTWIDTH] IN BLOOD BY AUTOMATED COUNT: 12.7 % (ref 12.3–15.4)
GLOBULIN UR ELPH-MCNC: 2.9 GM/DL
GLUCOSE SERPL-MCNC: 120 MG/DL (ref 65–99)
HCT VFR BLD AUTO: 40.1 % (ref 37.5–51)
HGB BLD-MCNC: 13.5 G/DL (ref 13–17.7)
LYMPHOCYTES # BLD AUTO: 1.6 10*3/MM3 (ref 0.7–3.1)
LYMPHOCYTES NFR BLD AUTO: 5.7 % (ref 19.6–45.3)
MCH RBC QN AUTO: 32.5 PG (ref 26.6–33)
MCHC RBC AUTO-ENTMCNC: 33.6 G/DL (ref 31.5–35.7)
MCV RBC AUTO: 96.7 FL (ref 79–97)
MONOCYTES # BLD AUTO: 1.3 10*3/MM3 (ref 0.1–0.9)
MONOCYTES NFR BLD AUTO: 4.4 % (ref 5–12)
NEUTROPHILS NFR BLD AUTO: 25.3 10*3/MM3 (ref 1.7–7)
NEUTROPHILS NFR BLD AUTO: 88.6 % (ref 42.7–76)
NRBC BLD AUTO-RTO: 0.1 /100 WBC (ref 0–0.2)
PLATELET # BLD AUTO: 196 10*3/MM3 (ref 140–450)
PMV BLD AUTO: 8 FL (ref 6–12)
POTASSIUM SERPL-SCNC: 4.1 MMOL/L (ref 3.5–5.2)
PROT SERPL-MCNC: 6.7 G/DL (ref 6–8.5)
RBC # BLD AUTO: 4.14 10*6/MM3 (ref 4.14–5.8)
SODIUM SERPL-SCNC: 140 MMOL/L (ref 136–145)
WBC NRBC COR # BLD: 28.5 10*3/MM3 (ref 3.4–10.8)

## 2023-01-04 PROCEDURE — 36415 COLL VENOUS BLD VENIPUNCTURE: CPT | Performed by: NURSE PRACTITIONER

## 2023-01-04 PROCEDURE — 25010000002 REMDESIVIR 100 MG/20ML SOLUTION 1 EACH VIAL: Performed by: NURSE PRACTITIONER

## 2023-01-04 PROCEDURE — 80053 COMPREHEN METABOLIC PANEL: CPT | Performed by: NURSE PRACTITIONER

## 2023-01-04 PROCEDURE — 25010000002 DEXAMETHASONE PER 1 MG: Performed by: NURSE PRACTITIONER

## 2023-01-04 PROCEDURE — 85025 COMPLETE CBC W/AUTO DIFF WBC: CPT | Performed by: NURSE PRACTITIONER

## 2023-01-04 PROCEDURE — 25010000002 CEFTRIAXONE PER 250 MG: Performed by: NURSE PRACTITIONER

## 2023-01-04 PROCEDURE — 82248 BILIRUBIN DIRECT: CPT | Performed by: NURSE PRACTITIONER

## 2023-01-04 RX ORDER — DEXAMETHASONE 6 MG/1
6 TABLET ORAL
Status: DISCONTINUED | OUTPATIENT
Start: 2023-01-05 | End: 2023-01-05 | Stop reason: HOSPADM

## 2023-01-04 RX ADMIN — DEXAMETHASONE SODIUM PHOSPHATE 6 MG: 4 INJECTION, SOLUTION INTRAMUSCULAR; INTRAVENOUS at 08:09

## 2023-01-04 RX ADMIN — LOSARTAN POTASSIUM 50 MG: 50 TABLET, FILM COATED ORAL at 08:09

## 2023-01-04 RX ADMIN — SODIUM CHLORIDE 75 ML/HR: 9 INJECTION, SOLUTION INTRAVENOUS at 14:09

## 2023-01-04 RX ADMIN — Medication 10 ML: at 08:09

## 2023-01-04 RX ADMIN — METOPROLOL TARTRATE 50 MG: 50 TABLET, FILM COATED ORAL at 08:09

## 2023-01-04 RX ADMIN — METOPROLOL TARTRATE 50 MG: 50 TABLET, FILM COATED ORAL at 21:50

## 2023-01-04 RX ADMIN — Medication 750 MG: at 21:50

## 2023-01-04 RX ADMIN — MAGNESIUM OXIDE TAB 400 MG (241.3 MG ELEMENTAL MG) 400 MG: 400 (241.3 MG) TAB at 08:07

## 2023-01-04 RX ADMIN — REMDESIVIR 100 MG: 100 INJECTION, POWDER, LYOPHILIZED, FOR SOLUTION INTRAVENOUS at 23:55

## 2023-01-04 RX ADMIN — CEFTRIAXONE 1 G: 1 INJECTION, POWDER, FOR SOLUTION INTRAMUSCULAR; INTRAVENOUS at 21:51

## 2023-01-04 RX ADMIN — Medication 750 MG: at 08:08

## 2023-01-04 RX ADMIN — RIVAROXABAN 20 MG: 20 TABLET, FILM COATED ORAL at 17:59

## 2023-01-04 RX ADMIN — ATORVASTATIN CALCIUM 10 MG: 10 TABLET, FILM COATED ORAL at 21:50

## 2023-01-04 NOTE — PROGRESS NOTES
[facility] Medicine Services   Daily Progress Note    Patient Name: Leroy Mccrary  : 1950  MRN: [id number]  Primary Care Physician:  Sydnie Rodriguez APRN  Date of admission: 2023      Subjective      Chief Complaint: shortness of breath/malaise    Admitted for COVID-19 and acute hypoxemic respiratory failure  Continues to improve since admission. On room air since this morning. Not getting SOB with ambulation/activity. Using IS every couple of hours. Continues treatment on antibiotics and antivirals along with steroids and aggressive pulmonary toilet.  Discussed case with the bedside RN. No other acute concerns.    Objective      Vitals:   Temp:  [97.4 °F (36.3 °C)-98.7 °F (37.1 °C)] 97.5 °F (36.4 °C)  Heart Rate:  [64-83] 64  Resp:  [17-18] 18  BP: (119-154)/(71-79) 125/71  Flow (L/min):  [1-2.5] 1    Physical Exam    GEN: WDWN, no acute distress  HEENT: NCAT, PERRLA, moist mucous membranes  NECK: Supple, midline trachea  CARD: RRR, no appreciable M/R/G, no peripheral edema  PULM: Fairly CTAB, remains, no appreciable wheezing today, still diminished at the bases, non-distressed  ABD: soft, NTND, normoactive bowel sounds throughout  NEURO: Grossly intact, non-focal exam  PSYCH: Pleasant     Result Review    Result Review:  I have personally reviewed the results from the time of this admission to 2023 11:07 EST and agree with these findings:  [x]  Laboratory  [x]  Microbiology  [x]  Radiology  [x]  EKG/Telemetry   [x]  Cardiology/Vascular   []  Pathology  []  Old records  []  Other:    Assessment & Plan        atorvastatin, 10 mg, Oral, Nightly  cefTRIAXone, 1 g, Intravenous, Q24H  [START ON 2023] dexamethasone, 6 mg, Oral, Daily With Breakfast  losartan, 50 mg, Oral, Daily  magnesium oxide, 400 mg, Oral, Daily  metoprolol tartrate, 50 mg, Oral, BID  niacin ER, 750 mg, Oral, BID  remdesivir, 100 mg, Intravenous, Q24H  rivaroxaban, 20 mg, Oral, Daily With Dinner       Pharmacy  Consult - Remdesivir,   sodium chloride, 75 mL/hr, Last Rate: 75 mL/hr (01/03/23 0016)         Active Hospital Problems:  Active Hospital Problems    Diagnosis    • **Acute hypoxemic respiratory failure due to COVID-19 (HCC)    • Paroxysmal atrial flutter (HCC)    • Hyperlipidemia    • Leukocytosis    • Pneumonia due to COVID-19 virus    • Acute respiratory disease due to COVID-19 virus    • Cardiac pacemaker in situ      Plan:   Continue antibiotics, antiviral therapy, and aggressive pulmonary toilet.  Continue steroids.  Off of oxygen.  Continue home medications for his chronic medical conditions as clinically appropriate.    DVT prophylaxis:  On Xarelto    CODE STATUS:    Code Status (Patient has no pulse and is not breathing): CPR (Attempt to Resuscitate)  Medical Interventions (Patient has pulse or is breathing): Full Support    Disposition:  Hopeful to discharge home tomorrow if he remains on room air.    This patient has been examined wearing appropriate Personal Protective Equipment. 01/04/23      Electronically signed by Abhijit Scott MD, 01/04/23, 11:07 EST.  [facility] Hospitalist Team

## 2023-01-04 NOTE — CASE MANAGEMENT/SOCIAL WORK
Continued Stay Note  [facility]     Patient Name: Leroy Mccrary  MRN: [id number]  Today's Date: 1/4/2023    Admit Date: 1/2/2023    Plan: Covid +, From Home with Wife, Watch for Oxygen needs, O2 1L/rm air.   Discharge Plan     Row Name 01/04/23 1130       Plan    Plan Covid +, From Home with Wife, Watch for Oxygen needs, O2 1L/rm air.    Plan Comments Remdesivir.            Chart review only.           Expected Discharge Date and Time     Expected Discharge Date Expected Discharge Time    Jan 6, 2023             Kary Owen RN

## 2023-01-04 NOTE — PLAN OF CARE
Goal Outcome Evaluation:  Plan of Care Reviewed With: patient        Progress: improving  Outcome Evaluation: Pt rested well through the night with no complaints. Pt is receiving IV abx and remdesivir. Currently on 2L oxygen, vitals are stable at this time, will continue to monitor.

## 2023-01-04 NOTE — PLAN OF CARE
Goal Outcome Evaluation:  Plan of Care Reviewed With: patient        Progress: improving  Outcome Evaluation: Pt resting in bed with no complaints at this time. Down to room air, steroids changed to po. Took a shower today. Possible d/c tomorrow. Will continue to monitor.

## 2023-01-05 VITALS
WEIGHT: 216.05 LBS | RESPIRATION RATE: 18 BRPM | DIASTOLIC BLOOD PRESSURE: 68 MMHG | OXYGEN SATURATION: 95 % | HEART RATE: 70 BPM | TEMPERATURE: 98.7 F | BODY MASS INDEX: 29.26 KG/M2 | HEIGHT: 72 IN | SYSTOLIC BLOOD PRESSURE: 174 MMHG

## 2023-01-05 PROBLEM — D89.832 CYTOKINE RELEASE SYNDROME, GRADE 2: Status: ACTIVE | Noted: 2023-01-05

## 2023-01-05 PROBLEM — D89.832 CYTOKINE RELEASE SYNDROME, GRADE 2: Status: RESOLVED | Noted: 2023-01-05 | Resolved: 2023-01-05

## 2023-01-05 PROBLEM — U07.1 ACUTE HYPOXEMIC RESPIRATORY FAILURE DUE TO COVID-19 (HCC): Status: RESOLVED | Noted: 2023-01-03 | Resolved: 2023-01-05

## 2023-01-05 PROBLEM — J96.01 ACUTE HYPOXEMIC RESPIRATORY FAILURE DUE TO COVID-19: Status: RESOLVED | Noted: 2023-01-03 | Resolved: 2023-01-05

## 2023-01-05 LAB
ALBUMIN SERPL-MCNC: 3.4 G/DL (ref 3.5–5.2)
ALBUMIN/GLOB SERPL: 1.3 G/DL
ALP SERPL-CCNC: 90 U/L (ref 39–117)
ALT SERPL W P-5'-P-CCNC: 20 U/L (ref 1–41)
ANION GAP SERPL CALCULATED.3IONS-SCNC: 9 MMOL/L (ref 5–15)
AST SERPL-CCNC: 16 U/L (ref 1–40)
BASOPHILS # BLD AUTO: 0 10*3/MM3 (ref 0–0.2)
BASOPHILS NFR BLD AUTO: 0.2 % (ref 0–1.5)
BILIRUB CONJ SERPL-MCNC: <0.2 MG/DL (ref 0–0.3)
BILIRUB SERPL-MCNC: 0.4 MG/DL (ref 0–1.2)
BUN SERPL-MCNC: 23 MG/DL (ref 8–23)
BUN/CREAT SERPL: 25 (ref 7–25)
CALCIUM SPEC-SCNC: 9 MG/DL (ref 8.6–10.5)
CHLORIDE SERPL-SCNC: 106 MMOL/L (ref 98–107)
CO2 SERPL-SCNC: 25 MMOL/L (ref 22–29)
CREAT SERPL-MCNC: 0.92 MG/DL (ref 0.76–1.27)
DEPRECATED RDW RBC AUTO: 44.2 FL (ref 37–54)
EGFRCR SERPLBLD CKD-EPI 2021: 87.8 ML/MIN/1.73
EOSINOPHIL # BLD AUTO: 0 10*3/MM3 (ref 0–0.4)
EOSINOPHIL NFR BLD AUTO: 0 % (ref 0.3–6.2)
ERYTHROCYTE [DISTWIDTH] IN BLOOD BY AUTOMATED COUNT: 12.8 % (ref 12.3–15.4)
GLOBULIN UR ELPH-MCNC: 2.6 GM/DL
GLUCOSE SERPL-MCNC: 122 MG/DL (ref 65–99)
HCT VFR BLD AUTO: 37.4 % (ref 37.5–51)
HGB BLD-MCNC: 12.6 G/DL (ref 13–17.7)
LYMPHOCYTES # BLD AUTO: 1.5 10*3/MM3 (ref 0.7–3.1)
LYMPHOCYTES NFR BLD AUTO: 8.6 % (ref 19.6–45.3)
MCH RBC QN AUTO: 31.7 PG (ref 26.6–33)
MCHC RBC AUTO-ENTMCNC: 33.7 G/DL (ref 31.5–35.7)
MCV RBC AUTO: 94 FL (ref 79–97)
MONOCYTES # BLD AUTO: 0.7 10*3/MM3 (ref 0.1–0.9)
MONOCYTES NFR BLD AUTO: 4 % (ref 5–12)
NEUTROPHILS NFR BLD AUTO: 15.6 10*3/MM3 (ref 1.7–7)
NEUTROPHILS NFR BLD AUTO: 87.2 % (ref 42.7–76)
NRBC BLD AUTO-RTO: 0 /100 WBC (ref 0–0.2)
PLATELET # BLD AUTO: 172 10*3/MM3 (ref 140–450)
PMV BLD AUTO: 7.7 FL (ref 6–12)
POTASSIUM SERPL-SCNC: 4.4 MMOL/L (ref 3.5–5.2)
PROT SERPL-MCNC: 6 G/DL (ref 6–8.5)
QT INTERVAL: 388 MS
RBC # BLD AUTO: 3.97 10*6/MM3 (ref 4.14–5.8)
SODIUM SERPL-SCNC: 140 MMOL/L (ref 136–145)
WBC NRBC COR # BLD: 17.9 10*3/MM3 (ref 3.4–10.8)

## 2023-01-05 PROCEDURE — 82248 BILIRUBIN DIRECT: CPT | Performed by: NURSE PRACTITIONER

## 2023-01-05 PROCEDURE — 80053 COMPREHEN METABOLIC PANEL: CPT | Performed by: NURSE PRACTITIONER

## 2023-01-05 PROCEDURE — 85025 COMPLETE CBC W/AUTO DIFF WBC: CPT | Performed by: NURSE PRACTITIONER

## 2023-01-05 RX ORDER — BENZONATATE 100 MG/1
100 CAPSULE ORAL 3 TIMES DAILY PRN
Qty: 20 CAPSULE | Refills: 0 | Status: SHIPPED | OUTPATIENT
Start: 2023-01-05

## 2023-01-05 RX ORDER — DEXAMETHASONE 6 MG/1
6 TABLET ORAL
Qty: 4 TABLET | Refills: 0 | Status: SHIPPED | OUTPATIENT
Start: 2023-01-06

## 2023-01-05 RX ADMIN — DEXAMETHASONE 6 MG: 6 TABLET ORAL at 08:18

## 2023-01-05 RX ADMIN — Medication 750 MG: at 08:20

## 2023-01-05 RX ADMIN — Medication 10 ML: at 08:19

## 2023-01-05 RX ADMIN — MAGNESIUM OXIDE TAB 400 MG (241.3 MG ELEMENTAL MG) 400 MG: 400 (241.3 MG) TAB at 08:19

## 2023-01-05 RX ADMIN — SODIUM CHLORIDE 75 ML/HR: 9 INJECTION, SOLUTION INTRAVENOUS at 04:25

## 2023-01-05 RX ADMIN — LOSARTAN POTASSIUM 50 MG: 50 TABLET, FILM COATED ORAL at 08:18

## 2023-01-05 RX ADMIN — METOPROLOL TARTRATE 50 MG: 50 TABLET, FILM COATED ORAL at 08:18

## 2023-01-05 NOTE — DISCHARGE SUMMARY
[facility] Medicine Services  Discharge Summary    Date of Service: 2023  Patient Name: Leroy Mccrary  : 1950  MRN: [id number]    Date of Admission: 2023  Discharge Diagnosis: COVID 19/Pneumonia  Date of Discharge:  2023  Primary Care Physician: Sydnie Rodriguez APRN      Presenting Problem:   Acute respiratory distress [R06.03]  Hypoxia [R09.02]  Pneumonia of right lower lobe due to infectious organism [J18.9]  COVID-19 [U07.1]    Active and Resolved Hospital Problems:  Active Hospital Problems    Diagnosis POA   • Paroxysmal atrial flutter (HCC) [I48.92] Yes   • Hyperlipidemia [E78.5] Yes   • Leukocytosis [D72.829] Yes   • Pneumonia due to COVID-19 virus [U07.1, J12.82] Yes   • Acute respiratory disease due to COVID-19 virus [U07.1, J06.9] Yes   • Cardiac pacemaker in situ [Z95.0] Yes      Resolved Hospital Problems    Diagnosis POA   • **Acute hypoxemic respiratory failure due to COVID-19 (HCC) [U07.1, J96.01] Yes   • Cytokine release syndrome, grade 2 [D89.832] No         Hospital Course     HPI:  Leroy Mccrary is a 72 y.o. male who presented to [facility] on 2023 complaining of 1 week of cough congestion fever body aches and fatigue.  He reports some chills and some dyspnea.  EMS was reported to have his sats in the 82%.  He is currently stable in the upper 90s on a nonrebreather.  He denies any dizziness, chest pain nausea vomiting or diarrhea.  He reports he took a home COVID test which was negative.  He was COVID-19 positive here with a WBC of 14.10 lactate 2.2 glucose 129.  ABG showed a pH of 7.468 PCO2 31.7 PO2 60.8 troponin 0.016 proBNP 614.6.  Chest x-ray showed per radiology showed patchy bibasilar airspace disease which may relate to atelectasis and/or pneumonia and a small left pleural effusion.  EKG shows a paced rhythm.  He does have a cardiac pacemaker in situ with a past medical history of atrial flutter he is on chronic  anticoagulation.  He reports he has had 3 COVID vaccines.  He was given albuterol in ED, DuoNeb treatment, IV dexamethasone 6 mg and pharmacy has been consulted for remdesivir.  Given elevated WBC he was started on IV azithromycin and IV ceftriaxone with blood cultures pending.  He will be admitted for further evaluation and treatment.    Hospital course:  He was admitted for antibiotics, antiviral therapy, and aggressive pulmonary toilet.  He was able to wean down to room air after couple of days.  He responded well to this treatment.  He is being discharged home today with close outpatient primary care follow-up to complete a course of oral steroid therapy.    Day of Discharge     Vital Signs:  Temp:  [97.3 °F (36.3 °C)-98.7 °F (37.1 °C)] 98.7 °F (37.1 °C)  Heart Rate:  [59-70] 70  Resp:  [18] 18  BP: (117-174)/(68-93) 174/68    Physical Exam:  GEN: WDWN, no acute distress  HEENT: NCAT, PERRLA, moist mucous membranes  NECK: Supple, midline trachea  CARD: RRR, no appreciable M/R/G, no peripheral edema  PULM: Fairly CTAB, remains, no appreciable wheezing today, still diminished at the bases, non-distressed  ABD: soft, NTND, normoactive bowel sounds throughout  NEURO: Grossly intact, non-focal exam  PSYCH: Pleasant           Pertinent  and/or Most Recent Results     LAB RESULTS:      Lab 01/05/23  0410 01/04/23  0451 01/03/23  0527 01/03/23  0016 01/02/23 1957 01/02/23 1929   WBC 17.90* 28.50* 28.40*  --   --  14.10*   HEMOGLOBIN 12.6* 13.5 13.4  --   --  14.7   HEMATOCRIT 37.4* 40.1 41.6  --   --  43.7   PLATELETS 172 196 192  --   --  213   NEUTROS ABS 15.60* 25.30* 26.60*  --   --  13.00*   LYMPHS ABS 1.50 1.60 0.70  --   --  0.80   MONOS ABS 0.70 1.30* 1.00*  --   --  0.20   EOS ABS 0.00 0.30 0.00  --   --  0.00   MCV 94.0 96.7 96.0  --   --  93.3   LACTATE  --   --   --  1.7 2.2*  --          Lab 01/05/23  0410 01/04/23  0451 01/03/23  0527 01/02/23  1929   SODIUM 140 140 138 140   POTASSIUM 4.4 4.1 4.6 3.9    CHLORIDE 106 105 104 104   CO2 25.0 24.0 24.0 23.0   ANION GAP 9.0 11.0 10.0 13.0   BUN 23 20 19 19   CREATININE 0.92 0.87 1.11 1.17   EGFR 87.8 91.1 70.1 65.8   GLUCOSE 122* 120* 161* 129*   CALCIUM 9.0 9.1 8.5* 9.1         Lab 01/05/23  0410 01/04/23  0451 01/03/23  0527 01/02/23 1929   TOTAL PROTEIN 6.0 6.7 6.3 6.6   ALBUMIN 3.4* 3.8 3.5 4.0   GLOBULIN 2.6 2.9 2.8 2.6   ALT (SGPT) 20 19 24 20   AST (SGOT) 16 17 25 20   BILIRUBIN 0.4 0.5 0.6 0.9   BILIRUBIN DIRECT <0.2 <0.2 <0.2  --    ALK PHOS 90 98 74 79         Lab 01/02/23 1929   PROBNP 614.6   TROPONIN T 0.016                 Lab 01/02/23 1948   PH, ARTERIAL 7.468*   PCO2, ARTERIAL 31.7*   PO2 ART 60.8*   O2 SATURATION ART 92.7*   FIO2 40   HCO3 ART 23.0   BASE EXCESS ART 0.2     Brief Urine Lab Results     None        Microbiology Results (last 10 days)     Procedure Component Value - Date/Time    Legionella Antigen, Urine - Urine, Urine, Clean Catch [[id number]]  (Normal) Collected: 01/03/23 1515    Lab Status: Final result Specimen: Urine, Clean Catch Updated: 01/03/23 1546     LEGIONELLA ANTIGEN, URINE Negative    Blood Culture - Blood, Arm, Right [[id number]]  (Normal) Collected: 01/02/23 2202    Lab Status: Preliminary result Specimen: Blood from Arm, Right Updated: 01/04/23 2215     Blood Culture No growth at 2 days    Blood Culture - Blood, Arm, Left [[id number]]  (Normal) Collected: 01/02/23 2135    Lab Status: Preliminary result Specimen: Blood from Arm, Left Updated: 01/04/23 2145     Blood Culture No growth at 2 days    Respiratory Panel PCR w/COVID-19(SARS-CoV-2) BENEDICTO/PRIYANKA/MARGARET/PAD/COR/MAD/BLAKE In-House, NP Swab in UTM/VTM, 3-4 HR TAT - Swab, Nasopharynx [[id number]]  (Abnormal) Collected: 01/02/23 1930    Lab Status: Final result Specimen: Swab from Nasopharynx Updated: 01/02/23 2024     ADENOVIRUS, PCR Not Detected     Coronavirus 229E Not Detected     Coronavirus HKU1 Not Detected     Coronavirus NL63 Not Detected     Coronavirus OC43 Not  Detected     COVID19 Detected     Human Metapneumovirus Not Detected     Human Rhinovirus/Enterovirus Not Detected     Influenza A PCR Not Detected     Influenza B PCR Not Detected     Parainfluenza Virus 1 Not Detected     Parainfluenza Virus 2 Not Detected     Parainfluenza Virus 3 Not Detected     Parainfluenza Virus 4 Not Detected     RSV, PCR Not Detected     Bordetella pertussis pcr Not Detected     Bordetella parapertussis PCR Not Detected     Chlamydophila pneumoniae PCR Not Detected     Mycoplasma pneumo by PCR Not Detected    Narrative:      In the setting of a positive respiratory panel with a viral infection PLUS a negative procalcitonin without other underlying concern for bacterial infection, consider observing off antibiotics or discontinuation of antibiotics and continue supportive care. If the respiratory panel is positive for atypical bacterial infection (Bordetella pertussis, Chlamydophila pneumoniae, or Mycoplasma pneumoniae), consider antibiotic de-escalation to target atypical bacterial infection.          NM Renal With Flow & Function With Pharmacological Intervention    Result Date: 12/13/2022  Impression:  1. Ascending nephrogram curve on the right with radiotracer retention consistent with hydronephrosis and obstruction. 2. Normal function of the left kidney with no evidence of obstruction.  Electronically Signed By-Allison Kemp MD On:12/13/2022 3:21 PM This report was finalized on [id number] by  Allison Kemp MD.    XR Chest 1 View    Result Date: 1/2/2023  Impression: 1. Patchy bibasilar airspace disease which may relate to atelectasis and/or pneumonia. 2. Suspect small left pleural effusion.  Electronically Signed By-Abe Escalera MD On:1/2/2023 8:05 PM This report was finalized on [id number] by  Abe Escalera MD.      Results for orders placed during the hospital encounter of 09/17/19    Adult Transthoracic Echo Complete W/ Cont if Necessary Per Protocol    Interpretation  Summary  · Left ventricular wall thickness is consistent with mild concentric hypertrophy.  · Left ventricular systolic function is normal.  · Right ventricular cavity is mild-to-moderately dilated.    Technically marginal study limited acoustical windows though grossly adequate for interpretation.    Aortic valve is trileaflet demonstrating adequate opening coaptation.  Mitral and tricuspid valves both structurally normal with satisfactory diastolic liver excursion.  Pulmonic valve not well visualized.  Right ventricular enlargement noted with satisfactory global contractility.  LV chamber size normal with mild concentric LVH but no segmental wall motion abnormalities LVEF 55 to 60%.  Normal left atrial dimension and aortic root with.  No intracardiac thrombus vegetation or masses identified.    Supplements Doppler exam shows normal aortic and mitral valve flow velocity with no significant regurgitant jets.  Trace TR noted RVSP quantitated 10 mmHg.  No significant markers for diastolic dysfunction.      Labs Pending at Discharge:  Pending Labs     Order Current Status    Blood Culture - Blood, Arm, Left Preliminary result    Blood Culture - Blood, Arm, Right Preliminary result          Consults:   Consults     Date and Time Order Name Status Description    1/2/2023  9:20 PM Hospitalist (on-call MD unless specified)            Discharge Details        Discharge Medications      New Medications      Instructions Start Date   benzonatate 100 MG capsule  Commonly known as: TESSALON   100 mg, Oral, 3 Times Daily PRN      dexamethasone 6 MG tablet  Commonly known as: DECADRON   6 mg, Oral, Daily With Breakfast   Start Date: January 6, 2023        Continue These Medications      Instructions Start Date   atorvastatin 10 MG tablet  Commonly known as: LIPITOR   10 mg, Oral, Daily      losartan 50 MG tablet  Commonly known as: COZAAR   50 mg, Oral, Daily      magnesium oxide 400 MG tablet  Commonly known as: MAG-OX   400 mg,  Oral, Daily      metoprolol tartrate 50 MG tablet  Commonly known as: LOPRESSOR   50 mg, Oral, 2 Times Daily      niacin  MG tablet controlled-release tablet   750 mg, Oral, 2 times daily      rivaroxaban 20 MG tablet  Commonly known as: XARELTO   20 mg, Oral, Daily, HOLD 48 per gsi              Allergies   Allergen Reactions   • Bextra [Valdecoxib] Unknown - Low Severity       Discharge Disposition:   Home or Self Care    Diet:  Hospital:  Diet Order   Procedures   • Diet: Cardiac Diets; Healthy Heart (2-3 Na+); Texture: Regular Texture (IDDSI 7); Fluid Consistency: Thin (IDDSI 0)         Discharge Activity:   Activity Instructions     Other Activity Restrictions      Type of Restriction: Other    Explain Other Restrictions: Provide patient with appropriate CDC COVID-19 isolation recommendations post-discharge            CODE STATUS:  Code Status and Medical Interventions:   Ordered at: 01/02/23 2214     Code Status (Patient has no pulse and is not breathing):    CPR (Attempt to Resuscitate)     Medical Interventions (Patient has pulse or is breathing):    Full Support         No future appointments.    Additional Instructions for the Follow-ups that You Need to Schedule     Call MD With Problems / Concerns   As directed      Instructions: PCM    Order Comments: Instructions: PCM          Discharge Follow-up with PCP   As directed       Currently Documented PCP:    Sydnie Rodriguez APRN    PCP Phone Number:    [phone]     Follow Up Details: within one week of discharge               Time spent on Discharge including face to face service:  25 minutes    This patient has been examined wearing appropriate Personal Protective Equipment . 01/05/23      Signature: Electronically signed by Abhijit Scott MD, 01/05/23, 09:41 EST.  [facility] Hospitalist Team

## 2023-01-05 NOTE — PLAN OF CARE
Goal Outcome Evaluation:         Patient resting comfortably at this time with no complaints. Planning to discharge today. Will continue to monitor.

## 2023-01-07 LAB
BACTERIA SPEC AEROBE CULT: NORMAL
BACTERIA SPEC AEROBE CULT: NORMAL

## 2025-01-27 ENCOUNTER — ANESTHESIA EVENT (OUTPATIENT)
Dept: GASTROENTEROLOGY | Facility: HOSPITAL | Age: 75
End: 2025-01-27
Payer: MEDICARE

## 2025-01-28 ENCOUNTER — HOSPITAL ENCOUNTER (OUTPATIENT)
Facility: HOSPITAL | Age: 75
Setting detail: HOSPITAL OUTPATIENT SURGERY
Discharge: HOME | End: 2025-01-28
Attending: INTERNAL MEDICINE | Admitting: INTERNAL MEDICINE
Payer: MEDICARE

## 2025-01-28 ENCOUNTER — ANESTHESIA (OUTPATIENT)
Dept: GASTROENTEROLOGY | Facility: HOSPITAL | Age: 75
End: 2025-01-28
Payer: MEDICARE

## 2025-01-28 ENCOUNTER — ON CAMPUS - OUTPATIENT (OUTPATIENT)
Dept: URBAN - METROPOLITAN AREA HOSPITAL 85 | Facility: HOSPITAL | Age: 75
End: 2025-01-28
Payer: MEDICARE

## 2025-01-28 VITALS
OXYGEN SATURATION: 98 % | RESPIRATION RATE: 8 BRPM | HEIGHT: 72 IN | TEMPERATURE: 97.6 F | SYSTOLIC BLOOD PRESSURE: 118 MMHG | BODY MASS INDEX: 27.58 KG/M2 | DIASTOLIC BLOOD PRESSURE: 69 MMHG | HEART RATE: 65 BPM | WEIGHT: 203.6 LBS

## 2025-01-28 DIAGNOSIS — K64.1 SECOND DEGREE HEMORRHOIDS: ICD-10-CM

## 2025-01-28 DIAGNOSIS — Z09 ENCOUNTER FOR FOLLOW-UP EXAMINATION AFTER COMPLETED TREATMEN: ICD-10-CM

## 2025-01-28 DIAGNOSIS — Z86.0101 PERSONAL HISTORY OF ADENOMATOUS AND SERRATED COLON POLYPS: ICD-10-CM

## 2025-01-28 PROCEDURE — 25010000002 PROPOFOL 10 MG/ML EMULSION: Performed by: NURSE ANESTHETIST, CERTIFIED REGISTERED

## 2025-01-28 PROCEDURE — 25010000002 LIDOCAINE PF 2% 2 % SOLUTION: Performed by: NURSE ANESTHETIST, CERTIFIED REGISTERED

## 2025-01-28 PROCEDURE — G0105 COLORECTAL SCRN; HI RISK IND: HCPCS | Performed by: INTERNAL MEDICINE

## 2025-01-28 PROCEDURE — 25810000003 SODIUM CHLORIDE 0.9 % SOLUTION: Performed by: INTERNAL MEDICINE

## 2025-01-28 RX ORDER — LIDOCAINE HYDROCHLORIDE 20 MG/ML
INJECTION, SOLUTION EPIDURAL; INFILTRATION; INTRACAUDAL; PERINEURAL AS NEEDED
Status: DISCONTINUED | OUTPATIENT
Start: 2025-01-28 | End: 2025-01-28 | Stop reason: SURG

## 2025-01-28 RX ORDER — ONDANSETRON 2 MG/ML
4 INJECTION INTRAMUSCULAR; INTRAVENOUS ONCE AS NEEDED
Status: DISCONTINUED | OUTPATIENT
Start: 2025-01-28 | End: 2025-01-28 | Stop reason: HOSPADM

## 2025-01-28 RX ORDER — SODIUM CHLORIDE 9 MG/ML
50 INJECTION, SOLUTION INTRAVENOUS CONTINUOUS
Status: DISCONTINUED | OUTPATIENT
Start: 2025-01-28 | End: 2025-01-28 | Stop reason: HOSPADM

## 2025-01-28 RX ORDER — AMIODARONE HYDROCHLORIDE 200 MG/1
200 TABLET ORAL DAILY
COMMUNITY

## 2025-01-28 RX ORDER — PROPOFOL 10 MG/ML
VIAL (ML) INTRAVENOUS AS NEEDED
Status: DISCONTINUED | OUTPATIENT
Start: 2025-01-28 | End: 2025-01-28 | Stop reason: SURG

## 2025-01-28 RX ADMIN — PROPOFOL 25 MG: 10 INJECTION, EMULSION INTRAVENOUS at 09:12

## 2025-01-28 RX ADMIN — PROPOFOL 50 MG: 10 INJECTION, EMULSION INTRAVENOUS at 09:07

## 2025-01-28 RX ADMIN — SODIUM CHLORIDE 50 ML/HR: 9 INJECTION, SOLUTION INTRAVENOUS at 07:36

## 2025-01-28 RX ADMIN — PROPOFOL 25 MG: 10 INJECTION, EMULSION INTRAVENOUS at 09:16

## 2025-01-28 RX ADMIN — PROPOFOL 25 MG: 10 INJECTION, EMULSION INTRAVENOUS at 09:10

## 2025-01-28 RX ADMIN — PROPOFOL 50 MG: 10 INJECTION, EMULSION INTRAVENOUS at 09:04

## 2025-01-28 RX ADMIN — LIDOCAINE HYDROCHLORIDE 50 MG: 20 INJECTION, SOLUTION EPIDURAL; INFILTRATION; INTRACAUDAL; PERINEURAL at 09:04

## 2025-01-28 NOTE — ANESTHESIA POSTPROCEDURE EVALUATION
Patient: Leroy Mccrary    Procedure Summary       Date: 01/28/25 [facility]    Anesthesia Start: 0854 Anesthesia Stop: 0920    Procedure: COLONOSCOPY Diagnosis:       Hx of colonic polyps      (Hx of colonic polyps [Z86.0100])    Surgeons: Mayco Bui MD Provider: Sage Redmond MD    Anesthesia Type: general ASA Status: 3            Anesthesia Type: general    Vitals  Vitals Value Taken Time   /69 01/28/25 0942   Temp     Pulse 65 01/28/25 0942   Resp 8 01/28/25 0942   SpO2 98 % 01/28/25 0942           Post Anesthesia Care and Evaluation    Patient location during evaluation: PACU  Patient participation: complete - patient participated  Level of consciousness: awake  Pain scale: See nurse's notes for pain score.  Pain management: adequate    Airway patency: patent  Anesthetic complications: No anesthetic complications  PONV Status: none  Cardiovascular status: acceptable  Respiratory status: acceptable and spontaneous ventilation  Hydration status: acceptable    Comments: Patient seen and examined postoperatively; vital signs stable; SpO2 greater than or equal to 90%; cardiopulmonary status stable; nausea/vomiting adequately controlled; pain adequately controlled; no apparent anesthesia complications; patient discharged from anesthesia care when discharge criteria were met

## 2025-01-28 NOTE — OP NOTE
COLONOSCOPY Procedure Report    Patient Name:  Leroy Mccrary  YOB: 1950    Date of Surgery:  1/28/2025     Preoperative diagnosis:  Personal history of colon adenoma    Postoperative diagnosis:  Grade 2 internal and external hemorrhoids    No CPT Code Applied in Case Entry    Procedure(s):  COLONOSCOPY for surveillance    Staff:  Surgeon(s):  Mayco Bui MD      Anesthesia: Monitored Anesthesia Care    Implants:    Nothing was implanted during the procedure    Specimen:        See below    DNR status: Patient wishes full code during the procedure    Estimated blood loss: Minimal     Complications:  None     Description of Procedure:  Informed consent was obtained for the procedure, including sedation.  Risks of perforation, hemorrhage, adverse drug reaction and aspiration were discussed.  The patient was brought into the endoscopy suite. Continuous cardiopulmonary monitoring was performed.  The patient was placed in the left lateral decubitus position. After adequate sedation was attained, the digital rectal exam was performed which showed external hemorrhoids.  Subsequently, the Olympus colonoscope was inserted into the patient's rectum and advanced to the level of the cecum and terminal ileum with photodocumentation without difficulty.  The bowel prep was good.  Circumferential examination of the patient's colon was performed on scope withdrawal. The cecum, ascending colon, and hepatic flexure were examined twice.  The transverse colon, splenic flexure, descending, sigmoid colon, and rectum were examined.  A retroflex exam was performed in the rectum.  The bowel was decompressed, the scope was withdrawn from the patient, and the patient tolerated the procedure well.     Findings:   Terminal ileum: Normal mucosa distal 10 cm  Colon: Normal mucosa to cecum.  Grade 2 internal and external hemorrhoids    Impression:  Colonoscopy showed grade 2 internal/external hemorrhoids and no  polyps to the cecum.    Recommendations:  No recall on colonoscopy  High-fiber diet    We appreciate the referral    Electronically signed by Mayco Bui MD, 01/28/25, 9:20 AM EST.

## 2025-01-28 NOTE — ANESTHESIA PREPROCEDURE EVALUATION
Anesthesia Evaluation     NPO Solid Status: > 8 hours  NPO Liquid Status: > 8 hours           Airway   Mallampati: I  TM distance: >3 FB  Neck ROM: full  No difficulty expected  Dental - normal exam     Pulmonary - normal exam   (+) pneumonia ,sleep apnea    ROS comment: Leroy Mccrary is a 70-year-old male patient who has history of atrial flutter status post ablation around 2007, complete heart block with pacemaker implantation with generator change out about 4 years ago, presents to the ER for evaluation of palpitations.  Patient was at his doctor's office where a device check was performed but ever since then he started developing lightheadedness and feeling of weakness, no syncopal episodes no chest pain no lower extremity edema.  He presented to the ER for further evaluation.  Upon arrival he was found to be in paced rhythm with PVCs.  Patient normally follows with Dr. Gaviria.  CHEMI 2021  Cardiovascular - normal exam    (+) pacemaker pacemaker, hypertension, dysrhythmias, hyperlipidemia      Neuro/Psych  GI/Hepatic/Renal/Endo      Musculoskeletal     Abdominal  - normal exam    Bowel sounds: normal.   Substance History      OB/GYN          Other          Other Comment: History Comments History Comments  Atrial flutter  Hypertension   Sleep apnea mild, does not need CPAP Hyperlipidemia   Elevated cholesterol  H/O atrial flutter   COVID-19 virus detected  Acute respiratory disease due to COVID-19 virus   Acute hypoxemic respiratory failure due to COVID-19  Pneumonia due to COVID-19 virus     Surgical History  Current as of 01/27/25 1940    PACEMAKER IMPLANTATION APPENDECTOMY  COLONOSCOPY EYE SURGERY  CARDIAC CATHETERIZATION       ROS/Med Hx Other: FLUTTER ABLATION TO COMPLETE HB NOW W PACEMAKER. DR GAVIRIA  TTE 2019 EF 55-60  COLONOSCOPY 2021 270MG PROPOFOL  XARELTO 72 HRS              Anesthesia Plan    ASA 3     general   total IV anesthesia  intravenous induction     Anesthetic plan, risks, benefits, and  alternatives have been provided, discussed and informed consent has been obtained with: patient.  Pre-procedure education provided  Plan discussed with CRNA.    CODE STATUS:

## 2025-01-28 NOTE — DISCHARGE INSTRUCTIONS
A responsible adult should stay with you and you should rest quietly for the rest of the day.    Do not drink alcohol, drive, operate any heavy machinery or power tools or make any legal/important decisions for the next 24 hours.     Progress your diet as tolerated.  If you begin to experience severe pain, increased shortness of breath, racing heartbeat or a fever above 101 F, seek immediate medical attention.     Follow up with MD as instructed. Call office for results in 3 to 5 days if needed.    [phone]    Colonoscopy showed grade 2 internal/external hemorrhoids and no polyps to the cecum.     Recommendations:  No recall on colonoscopy  High-fiber diet

## 2025-01-28 NOTE — ANESTHESIA POSTPROCEDURE EVALUATION
Patient: Leroy Mccrary    Procedure Summary       Date: 01/28/25 Room / Location: [facility] ENDOSCOPY 4 / [facility] ENDOSCOPY    Anesthesia Start: 0854 Anesthesia Stop: 0920    Procedure: COLONOSCOPY Diagnosis:       Hx of colonic polyps      (Hx of colonic polyps [Z86.0100])    Surgeons: Mayco Bui MD Provider:     Anesthesia Type: general ASA Status: 3            Anesthesia Type: general    Vitals  Vitals Value Taken Time   /69 01/28/25 0942   Temp     Pulse 65 01/28/25 0942   Resp 8 01/28/25 0942   SpO2 98 % 01/28/25 0942           Post Anesthesia Care and Evaluation    Patient location during evaluation: PACU  Patient participation: complete - patient participated  Level of consciousness: awake  Pain scale: See nurse's notes for pain score.  Pain management: adequate    Airway patency: patent  Anesthetic complications: No anesthetic complications  PONV Status: none  Cardiovascular status: acceptable  Respiratory status: acceptable and spontaneous ventilation  Hydration status: acceptable    Comments: Patient seen and examined postoperatively; vital signs stable; SpO2 greater than or equal to 90%; cardiopulmonary status stable; nausea/vomiting adequately controlled; pain adequately controlled; no apparent anesthesia complications; patient discharged from anesthesia care when discharge criteria were met

## 2025-01-28 NOTE — H&P
GI CONSULT  NOTE:    Referring Provider:    Sydnie Rodriguez APRN Matthew David McColloug*    Chief complaint: Personal history of adenomatous and serrated colon polyps    History of present illness:      Leroy Mccrary is a 74 y.o. male who presents today for Procedure(s):  COLONOSCOPY for the indications listed below.     The updated Patient Profile was reviewed prior to the procedure, in conjunction with the Physical Exam, including medical conditions, surgical procedures, medications, allergies, family history and social history.     Pre-operatively, I reviewed the indication(s) for the procedure, the risks of the procedure [including but not limited to: unexpected bleeding possibly requiring hospitalization and/or unplanned repeat procedures, perforation possibly requiring surgical treatment, missed lesions and complications of sedation/MAC (also explained by anesthesia staff)].     I have evaluated the patient for risks associated with the planned anesthesia and the procedure to be performed and find the patient an acceptable candidate for IV sedation.    Multiple opportunities were provided for any questions or concerns, and all questions were answered satisfactorily before any anesthesia was administered. We will proceed with the planned procedure.    Past Medical History:  Past Medical History:   Diagnosis Date    Acute hypoxemic respiratory failure due to COVID-19 1/3/2023    Acute respiratory disease due to COVID-19 virus 01/02/2023    Atrial flutter     COVID-19 virus detected 01/02/2023    Elevated cholesterol     H/O atrial flutter     Hyperlipidemia     Hypertension     Pneumonia due to COVID-19 virus 1/3/2023    Sleep apnea     mild, does not need CPAP       Past Surgical History:  Past Surgical History:   Procedure Laterality Date    APPENDECTOMY      CARDIAC CATHETERIZATION      none in the last 3 years ago    COLONOSCOPY N/A 5/5/2021    Procedure: COLONOSCOPY with 1 hot and 2 cold polyps  with clip placement in sigmoid;  Surgeon: Mayco Bui MD;  Location: [facility] ENDOSCOPY;  Service: Gastroenterology;  Laterality: N/A;  colon polyps, internal hemorrhoids    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 No pertinent family history.    Medications:  Medications Prior to Admission   Medication Sig Dispense Refill Last Dose/Taking    atorvastatin (LIPITOR) 10 MG tablet Take 1 tablet by mouth Daily.   Taking    dexamethasone (DECADRON) 6 MG tablet Take 1 tablet by mouth Daily With Breakfast. 4 tablet 0 Taking    losartan (COZAAR) 50 MG tablet Take 1 tablet by mouth Daily.   Taking    magnesium oxide (MAG-OX) 400 MG tablet Take 1 tablet by mouth Daily.   Taking    metoprolol tartrate (LOPRESSOR) 50 MG tablet Take 1 tablet by mouth 2 (Two) Times a Day.   Taking    niacin  MG tablet controlled-release tablet Take 1 tablet by mouth 2 (two) times a day.   2025    rivaroxaban (XARELTO) 20 MG tablet Take 1 tablet by mouth Daily. HOLD 48 per gsi   Taking    benzonatate (TESSALON) 100 MG capsule Take 1 capsule by mouth 3 (Three) Times a Day As Needed for Cough. 20 capsule 0        Scheduled Meds:  Continuous Infusions:No current facility-administered medications for this encounter.    PRN Meds:.    ALLERGIES:  Bextra [valdecoxib]    ROS:  The following systems were reviewed and negative;   Constitution:  No fevers, chills, no unintentional weight loss  Skin: no rash, no jaundice  Eyes:  No blurry vision,  "no eye pain  HENT:  No change in hearing or smell  Resp:  No dyspnea or cough  CV:  No chest pain or palpitations  :  No dysuria, hematuria  Musculoskeletal:  No leg cramps or arthralgias  Neuro:  No tremor, no numbness  Psych:  No depression or confusion    Objective     Vital Signs:   Vitals:    01/24/25 1334   Weight: 93.9 kg (207 lb)   Height: 182.9 cm (72\")       Physical Exam:       General Appearance:    Awake and alert, in no acute distress   Head:    Normocephalic, without obvious abnormality, atraumatic   Throat:   No oral lesions, no thrush, oral mucosa moist   Lungs:     respirations regular, even and unlabored   Skin:   No rash, no jaundice       Results Review:  Lab Results (last 24 hours)       ** No results found for the last 24 hours. **            Imaging Results (Last 24 Hours)       ** No results found for the last 24 hours. **             I reviewed the patient's labs and imaging.    ASSESSMENT AND PLAN:      Principal Problem:    Personal history of adenomatous and serrated colon polyps       Procedure(s):  COLONOSCOPY      I discussed the patients findings and my recommendations with the patient.    Electronically signed by Mayco Bui MD, 01/28/25, 6:36 AM EST.              "

## 2025-03-27 ENCOUNTER — CLINICAL SUPPORT NO REQUIREMENTS (OUTPATIENT)
Dept: CARDIOLOGY | Facility: CLINIC | Age: 75
End: 2025-03-27
Payer: MEDICARE

## 2025-03-27 ENCOUNTER — OFFICE VISIT (OUTPATIENT)
Dept: CARDIOLOGY | Facility: CLINIC | Age: 75
End: 2025-03-27
Payer: MEDICARE

## 2025-03-27 VITALS
WEIGHT: 210.5 LBS | OXYGEN SATURATION: 97 % | BODY MASS INDEX: 28.51 KG/M2 | DIASTOLIC BLOOD PRESSURE: 77 MMHG | HEART RATE: 62 BPM | HEIGHT: 72 IN | SYSTOLIC BLOOD PRESSURE: 118 MMHG

## 2025-03-27 DIAGNOSIS — I49.5 SICK SINUS SYNDROME: ICD-10-CM

## 2025-03-27 DIAGNOSIS — E78.00 PURE HYPERCHOLESTEROLEMIA: ICD-10-CM

## 2025-03-27 DIAGNOSIS — I10 PRIMARY HYPERTENSION: ICD-10-CM

## 2025-03-27 DIAGNOSIS — I44.2 COMPLETE HEART BLOCK: Primary | ICD-10-CM

## 2025-03-27 DIAGNOSIS — I44.2 COMPLETE HEART BLOCK: ICD-10-CM

## 2025-03-27 DIAGNOSIS — I48.92 PAROXYSMAL ATRIAL FLUTTER: ICD-10-CM

## 2025-03-27 DIAGNOSIS — G47.33 OBSTRUCTIVE SLEEP APNEA: ICD-10-CM

## 2025-03-27 DIAGNOSIS — Z95.0 CARDIAC PACEMAKER IN SITU: Primary | ICD-10-CM

## 2025-03-27 PROCEDURE — 99204 OFFICE O/P NEW MOD 45 MIN: CPT | Performed by: INTERNAL MEDICINE

## 2025-03-27 PROCEDURE — 1160F RVW MEDS BY RX/DR IN RCRD: CPT | Performed by: INTERNAL MEDICINE

## 2025-03-27 PROCEDURE — 1159F MED LIST DOCD IN RCRD: CPT | Performed by: INTERNAL MEDICINE

## 2025-03-27 PROCEDURE — 93000 ELECTROCARDIOGRAM COMPLETE: CPT | Performed by: INTERNAL MEDICINE

## 2025-03-27 NOTE — PROGRESS NOTES
"    Subjective:     Encounter Date:03/27/2025      Patient ID: Leroy Mccrary is a 74 y.o. male.    Chief Complaint:  History of Present Illness 73-year-old white male with history of atrial flutter with sick sinus syndrome status post pacemaker placement history of hyperlipidemia hypertension and sleep apnea presents to my office for a f new consultation patient is currently stable without any symptoms of chest pain or shortness of breath at rest or exertion.  No complaint of any PND or orthopnea.  No palpitations dizziness syncope or swelling of the feet.  Patient is taking all the medicines regular.  Patient does not smoke.  Patient's pacemaker is working very well.  /77   Pulse 62   Ht 182.9 cm (72\")   Wt 95.5 kg (210 lb 8 oz)   SpO2 97%   BMI 28.55 kg/m²     The following portions of the patient's history were reviewed and updated as appropriate: allergies, current medications, past family history, past medical history, past social history, past surgical history, and problem list.  Past Medical History:   Diagnosis Date    Acute hypoxemic respiratory failure due to COVID-19 1/3/2023    Acute respiratory disease due to COVID-19 virus 01/02/2023    Atrial flutter     COVID-19 virus detected 01/02/2023    Elevated cholesterol     H/O atrial flutter     Hyperlipidemia     Hypertension     Pneumonia due to COVID-19 virus 1/3/2023    Sleep apnea     mild, does not need CPAP     Past Surgical History:   Procedure Laterality Date    APPENDECTOMY      CARDIAC CATHETERIZATION      none in the last 3 years ago    COLONOSCOPY N/A 5/5/2021    Procedure: COLONOSCOPY with 1 hot and 2 cold polyps with clip placement in sigmoid;  Surgeon: Mayco Bui MD;  Location: [facility] ENDOSCOPY;  Service: Gastroenterology;  Laterality: N/A;  colon polyps, internal hemorrhoids    COLONOSCOPY N/A 1/28/2025    Procedure: COLONOSCOPY;  Surgeon: Mayco Bui MD;  Location: [facility] ENDOSCOPY;  Service: " Gastroenterology;  Laterality: N/A;  post- hemorrhoids    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 No pertinent family history.    Current Outpatient Medications:     amiodarone (PACERONE) 200 MG tablet, Take 1 tablet by mouth Daily., Disp: , Rfl:     atorvastatin (LIPITOR) 10 MG tablet, Take 1 tablet by mouth Daily., Disp: , Rfl:     losartan (COZAAR) 50 MG tablet, Take 1 tablet by mouth Daily., Disp: , Rfl:     magnesium oxide (MAG-OX) 400 MG tablet, Take 1 tablet by mouth Daily., Disp: , Rfl:     metoprolol tartrate (LOPRESSOR) 50 MG tablet, Take 1 tablet by mouth 2 (Two) Times a Day., Disp: , Rfl:     niacin  MG tablet controlled-release tablet, Take 1 tablet by mouth 2 (two) times a day., Disp: , Rfl:     rivaroxaban (XARELTO) 20 MG tablet, Take 1 tablet by mouth Daily. HOLD 48 per gsi, Disp: , Rfl:   Allergies   Allergen Reactions    Bextra [Valdecoxib] Unknown - Low Severity       Review of Systems   Constitutional: Negative for fever and malaise/fatigue.   HENT:  Negative for ear pain and nosebleeds.    Eyes:  Negative for blurred vision and double vision.   Cardiovascular:  Negative for chest pain, dyspnea on exertion and palpitations.   Respiratory:  Negative for cough and shortness of breath.    Skin:  Negative for rash.   Musculoskeletal:  Negative for joint pain.   Gastrointestinal:  Negative for abdominal pain, nausea and vomiting.   Neurological:  Negative for  focal weakness and headaches.   Psychiatric/Behavioral:  Negative for depression. The patient is not nervous/anxious.    All other systems reviewed and are negative.             Objective:     Constitutional:       Appearance: Well-developed.   Eyes:      General: No scleral icterus.     Conjunctiva/sclera: Conjunctivae normal.      Pupils: Pupils are equal, round, and reactive to light.   HENT:      Head: Normocephalic and atraumatic.   Neck:      Vascular: No carotid bruit or JVD.   Pulmonary:      Effort: Pulmonary effort is normal.      Breath sounds: Normal breath sounds. No wheezing. No rales.   Cardiovascular:      Normal rate. Regular rhythm.   Pulses:     Intact distal pulses.   Abdominal:      General: Bowel sounds are normal.      Palpations: Abdomen is soft.   Musculoskeletal: Normal range of motion.      Cervical back: Normal range of motion and neck supple. Skin:     General: Skin is warm and dry.      Findings: No rash.   Neurological:      Mental Status: Alert.      Comments: No focal deficits           ECG 12 Lead    Date/Time: 3/27/2025 12:33 PM  Performed by: Collin Moraes MD    Authorized by: Collin Moraes MD  Comments: Dual-chamber pacemaker rhythm  Abnormal EKG  No previous EKGs available          Lab Review:       Assessment:          Diagnosis Plan   1. Complete heart block        2. Sick sinus syndrome        3. Pure hypercholesterolemia        4. Paroxysmal atrial flutter        5. Primary hypertension        6. Obstructive sleep apnea               Plan:       Patient has history of atrial fibrillation is currently on medical therapy with amiodarone metoprolol and Xarelto for anticoagulation is currently in sinus rhythm with ventricular pacemaker rhythm  Patient had a pacemaker for tachybradycardia syndrome and is currently working very well  Patient blood pressure and heart rate are stable with metoprolol and losartan  Patient is also on atorvastatin the lipid levels are followed by the  primary care doctor.

## 2025-03-28 ENCOUNTER — PATIENT ROUNDING (BHMG ONLY) (OUTPATIENT)
Dept: CARDIOLOGY | Facility: CLINIC | Age: 75
End: 2025-03-28
Payer: MEDICARE

## 2025-03-28 NOTE — PROGRESS NOTES
A My-Chart message has been sent to the patient for PATIENT [place] with [facility] [place]

## (undated) DEVICE — PAPR PRNT PK SONY W RIBN UPC55

## (undated) DEVICE — PK ENDO GI 50

## (undated) DEVICE — TRAP WIDEEYE POLYP

## (undated) DEVICE — ERBE NESSY®PLATE 170 SPLIT; 168CM²; CABLE 3M: Brand: ERBE